# Patient Record
Sex: MALE | Race: BLACK OR AFRICAN AMERICAN | NOT HISPANIC OR LATINO | URBAN - METROPOLITAN AREA
[De-identification: names, ages, dates, MRNs, and addresses within clinical notes are randomized per-mention and may not be internally consistent; named-entity substitution may affect disease eponyms.]

---

## 2020-10-06 ENCOUNTER — INPATIENT (INPATIENT)
Facility: HOSPITAL | Age: 65
LOS: 3 days | Discharge: HOME | End: 2020-10-10
Attending: INTERNAL MEDICINE | Admitting: INTERNAL MEDICINE
Payer: COMMERCIAL

## 2020-10-06 VITALS
DIASTOLIC BLOOD PRESSURE: 75 MMHG | RESPIRATION RATE: 18 BRPM | HEART RATE: 105 BPM | SYSTOLIC BLOOD PRESSURE: 138 MMHG | TEMPERATURE: 98 F | OXYGEN SATURATION: 99 %

## 2020-10-06 DIAGNOSIS — E05.90 THYROTOXICOSIS, UNSPECIFIED WITHOUT THYROTOXIC CRISIS OR STORM: ICD-10-CM

## 2020-10-06 LAB
ALBUMIN SERPL ELPH-MCNC: 3.8 G/DL — SIGNIFICANT CHANGE UP (ref 3.3–5)
ALP SERPL-CCNC: 72 U/L — SIGNIFICANT CHANGE UP (ref 40–120)
ALT FLD-CCNC: 16 U/L — SIGNIFICANT CHANGE UP (ref 4–41)
ANION GAP SERPL CALC-SCNC: 12 MMO/L — SIGNIFICANT CHANGE UP (ref 7–14)
AST SERPL-CCNC: 13 U/L — SIGNIFICANT CHANGE UP (ref 4–40)
BASE EXCESS BLDV CALC-SCNC: 0.3 MMOL/L — SIGNIFICANT CHANGE UP
BASOPHILS # BLD AUTO: 0.05 K/UL — SIGNIFICANT CHANGE UP (ref 0–0.2)
BASOPHILS NFR BLD AUTO: 0.3 % — SIGNIFICANT CHANGE UP (ref 0–2)
BILIRUB SERPL-MCNC: 0.6 MG/DL — SIGNIFICANT CHANGE UP (ref 0.2–1.2)
BLOOD GAS VENOUS - CREATININE: 0.78 MG/DL — SIGNIFICANT CHANGE UP (ref 0.5–1.3)
BLOOD GAS VENOUS - FIO2: 21 — SIGNIFICANT CHANGE UP
BUN SERPL-MCNC: 18 MG/DL — SIGNIFICANT CHANGE UP (ref 7–23)
CALCIUM SERPL-MCNC: 9.6 MG/DL — SIGNIFICANT CHANGE UP (ref 8.4–10.5)
CHLORIDE BLDV-SCNC: 102 MMOL/L — SIGNIFICANT CHANGE UP (ref 96–108)
CHLORIDE SERPL-SCNC: 99 MMOL/L — SIGNIFICANT CHANGE UP (ref 98–107)
CO2 SERPL-SCNC: 24 MMOL/L — SIGNIFICANT CHANGE UP (ref 22–31)
CREAT SERPL-MCNC: 0.71 MG/DL — SIGNIFICANT CHANGE UP (ref 0.5–1.3)
EOSINOPHIL # BLD AUTO: 0.08 K/UL — SIGNIFICANT CHANGE UP (ref 0–0.5)
EOSINOPHIL NFR BLD AUTO: 0.5 % — SIGNIFICANT CHANGE UP (ref 0–6)
GAS PNL BLDV: 136 MMOL/L — SIGNIFICANT CHANGE UP (ref 136–146)
GLUCOSE BLDV-MCNC: 190 MG/DL — HIGH (ref 70–99)
GLUCOSE SERPL-MCNC: 200 MG/DL — HIGH (ref 70–99)
HCO3 BLDV-SCNC: 23 MMOL/L — SIGNIFICANT CHANGE UP (ref 20–27)
HCT VFR BLD CALC: 36.5 % — LOW (ref 39–50)
HCT VFR BLDV CALC: 39.3 % — SIGNIFICANT CHANGE UP (ref 39–51)
HGB BLD-MCNC: 11.8 G/DL — LOW (ref 13–17)
HGB BLDV-MCNC: 12.8 G/DL — LOW (ref 13–17)
IMM GRANULOCYTES NFR BLD AUTO: 0.4 % — SIGNIFICANT CHANGE UP (ref 0–1.5)
LACTATE BLDV-MCNC: 1.5 MMOL/L — SIGNIFICANT CHANGE UP (ref 0.5–2)
LIDOCAIN IGE QN: 39.9 U/L — SIGNIFICANT CHANGE UP (ref 7–60)
LYMPHOCYTES # BLD AUTO: 15.7 % — SIGNIFICANT CHANGE UP (ref 13–44)
LYMPHOCYTES # BLD AUTO: 2.62 K/UL — SIGNIFICANT CHANGE UP (ref 1–3.3)
MCHC RBC-ENTMCNC: 30.6 PG — SIGNIFICANT CHANGE UP (ref 27–34)
MCHC RBC-ENTMCNC: 32.3 % — SIGNIFICANT CHANGE UP (ref 32–36)
MCV RBC AUTO: 94.6 FL — SIGNIFICANT CHANGE UP (ref 80–100)
MONOCYTES # BLD AUTO: 1.37 K/UL — HIGH (ref 0–0.9)
MONOCYTES NFR BLD AUTO: 8.2 % — SIGNIFICANT CHANGE UP (ref 2–14)
NEUTROPHILS # BLD AUTO: 12.51 K/UL — HIGH (ref 1.8–7.4)
NEUTROPHILS NFR BLD AUTO: 74.9 % — SIGNIFICANT CHANGE UP (ref 43–77)
NRBC # FLD: 0 K/UL — SIGNIFICANT CHANGE UP (ref 0–0)
NT-PROBNP SERPL-SCNC: 32.32 PG/ML — SIGNIFICANT CHANGE UP
PCO2 BLDV: 45 MMHG — SIGNIFICANT CHANGE UP (ref 41–51)
PH BLDV: 7.36 PH — SIGNIFICANT CHANGE UP (ref 7.32–7.43)
PLATELET # BLD AUTO: 417 K/UL — HIGH (ref 150–400)
PMV BLD: 9.6 FL — SIGNIFICANT CHANGE UP (ref 7–13)
PO2 BLDV: < 24 MMHG — LOW (ref 35–40)
POTASSIUM BLDV-SCNC: 4.3 MMOL/L — SIGNIFICANT CHANGE UP (ref 3.4–4.5)
POTASSIUM SERPL-MCNC: 4.3 MMOL/L — SIGNIFICANT CHANGE UP (ref 3.5–5.3)
POTASSIUM SERPL-SCNC: 4.3 MMOL/L — SIGNIFICANT CHANGE UP (ref 3.5–5.3)
PROT SERPL-MCNC: 8.3 G/DL — SIGNIFICANT CHANGE UP (ref 6–8.3)
RBC # BLD: 3.86 M/UL — LOW (ref 4.2–5.8)
RBC # FLD: 11.2 % — SIGNIFICANT CHANGE UP (ref 10.3–14.5)
SAO2 % BLDV: 30.4 % — LOW (ref 60–85)
SODIUM SERPL-SCNC: 135 MMOL/L — SIGNIFICANT CHANGE UP (ref 135–145)
T3 SERPL-MCNC: 266.3 NG/DL — HIGH (ref 80–200)
T3 SERPL-MCNC: 275.3 NG/DL — HIGH (ref 80–200)
T4 FREE SERPL-MCNC: 5.73 NG/DL — HIGH (ref 0.9–1.8)
TSH SERPL-MCNC: < 0.1 UIU/ML — LOW (ref 0.27–4.2)
WBC # BLD: 16.69 K/UL — HIGH (ref 3.8–10.5)
WBC # FLD AUTO: 16.69 K/UL — HIGH (ref 3.8–10.5)

## 2020-10-06 PROCEDURE — 71260 CT THORAX DX C+: CPT | Mod: 26

## 2020-10-06 PROCEDURE — 74177 CT ABD & PELVIS W/CONTRAST: CPT | Mod: 26

## 2020-10-06 PROCEDURE — 99285 EMERGENCY DEPT VISIT HI MDM: CPT

## 2020-10-06 RX ORDER — METOPROLOL TARTRATE 50 MG
25 TABLET ORAL ONCE
Refills: 0 | Status: COMPLETED | OUTPATIENT
Start: 2020-10-06 | End: 2020-10-06

## 2020-10-06 RX ADMIN — Medication 25 MILLIGRAM(S): at 21:59

## 2020-10-06 NOTE — ED PROVIDER NOTE - CLINICAL SUMMARY MEDICAL DECISION MAKING FREE TEXT BOX
65M with unintentional weight loss, sent in by his PMD for malignancy w/u. labs, ct chest, a/p. no fevers.

## 2020-10-06 NOTE — ED PROVIDER NOTE - CONSTITUTIONAL, MLM
normal... thin, temporal wasting. Well appearing, awake, alert, oriented to person, place, time/situation and in no apparent distress.

## 2020-10-06 NOTE — ED ADULT NURSE NOTE - OBJECTIVE STATEMENT
Pt A/Ox4 states he has noticed that he lost 30 lbs in the last month, Pt states he has also had abdominal pain for the last 2-3 weeks, denies n/v/d. Pt states he was seen by his PCP and was told to come to the ED for eval, denies other sx. Pt appears well, but slightly weak, breathing even and unlabored, skin warm and dry. PIV inserted with aseptic technique 20G per RT AC, blood drawn, labeled at bedside with 2 pt identifiers and sent to lab. Pt aware of POC, NAD. Will continue to monitor. Pt pending MD whitley.

## 2020-10-06 NOTE — ED ADULT NURSE REASSESSMENT NOTE - NS ED NURSE REASSESS COMMENT FT1
Received report GISEL Theodore. Pt resting comfortably at this time, resp. even and unlabored. Denies any complaints at this time. VS as noted. NSR on the CM. NAD. Will continue to monitor.

## 2020-10-06 NOTE — ED PROVIDER NOTE - OBJECTIVE STATEMENT
65M with no PMHx sent in by his PMD for 30lb weight loss over the past 1 month. patient has no complaints. weight loss in unintentional. non-smoker. no fevers. 65M with no PMHx sent in by his PMD for 30lb weight loss over the past 1 month. patient has no complaints. weight loss in unintentional. non-smoker. no fevers. does report intermittent lower abdominal pain when he is constipated. none now.

## 2020-10-06 NOTE — CHART NOTE - NSCHARTNOTEFT_GEN_A_CORE
65M with no PMHx sent in by his PMD for malignancy work up for 30lb weight loss over the past 1 month. patient has no complaints. weight loss in unintentional. non-smoker. no fevers. does report intermittent lower abdominal pain when he is constipated. none now.  Found to have TSH <0.01. Afebrile, Tachycardic to HR  sinus rhythm. -155 systolic. AAOx3, no signs of fluid overload, abdominal exam benign. Had CT A/P with contrast for malignancy work up. Found to have 1.4 cm indeterminate left adrenal gland nodule.     Recommendations  - please obtain Free T4 and total T3  - recommend to start BB with either metoprolol or propanolol and titrate to goal normal HR (60-90) with holding parameters for HR and BP    D/W Team    Formal consult tomorrow    Shelby Mullen DO, Endocrine Fellow   Pager [phone]. from 9-5PM. After hours and on weekends please call [phone]. 65M with no PMHx sent in by his PMD for malignancy work up for 30lb weight loss over the past 1 month. patient has no complaints. weight loss in unintentional. non-smoker. no fevers. does report intermittent lower abdominal pain when he is constipated. none now.  Found to have TSH <0.01. Afebrile, Tachycardic to HR  sinus rhythm. -155 systolic. Per team, pt is AAOx3, no signs of fluid overload, abdominal exam benign. Had CT A/P with contrast for malignancy work up. Found to have 1.4 cm indeterminate left adrenal gland nodule.     Pt is not in thyroid storm  Recommendations:  - please obtain Free T4 and total T3  - recommend to start BB with either metoprolol or propanolol and titrate to goal normal HR (60-90) with holding parameters for HR and BP    D/W Team    Formal consult tomorrow    Shelby Mullen DO, Endocrine Fellow   Pager [phone]. from 9-5PM. After hours and on weekends please call [phone].

## 2020-10-06 NOTE — ED PROVIDER NOTE - PROGRESS NOTE DETAILS
Telly: TSH noted. endocrine consulted, awaiting callback. will admit to medicine on tele and start propranolol and methimazole. Bennett-Wartofsky score: 5 (HR 104bpm). Telly: results of ct a/p noted with adrenal nodule. spoke with Endocrine. recommending to wait until free t4 and t3 resulted to start propranolol and only needed if HR >100. will call back once TFTs resulted. Telly: Endocrine fellow called back- after reviewing records she is recommending to start BB, metoprolol 25mg q12 or propranolol 10-20mg to goal HR of 60-80bpm. will order one dose of Metoprolol 25mg now while in ED.

## 2020-10-07 DIAGNOSIS — R63.4 ABNORMAL WEIGHT LOSS: ICD-10-CM

## 2020-10-07 DIAGNOSIS — E11.65 TYPE 2 DIABETES MELLITUS WITH HYPERGLYCEMIA: ICD-10-CM

## 2020-10-07 DIAGNOSIS — Z29.9 ENCOUNTER FOR PROPHYLACTIC MEASURES, UNSPECIFIED: ICD-10-CM

## 2020-10-07 DIAGNOSIS — E11.9 TYPE 2 DIABETES MELLITUS WITHOUT COMPLICATIONS: ICD-10-CM

## 2020-10-07 DIAGNOSIS — I10 ESSENTIAL (PRIMARY) HYPERTENSION: ICD-10-CM

## 2020-10-07 DIAGNOSIS — R65.10 SYSTEMIC INFLAMMATORY RESPONSE SYNDROME (SIRS) OF NON-INFECTIOUS ORIGIN WITHOUT ACUTE ORGAN DYSFUNCTION: ICD-10-CM

## 2020-10-07 DIAGNOSIS — E05.90 THYROTOXICOSIS, UNSPECIFIED WITHOUT THYROTOXIC CRISIS OR STORM: ICD-10-CM

## 2020-10-07 DIAGNOSIS — E27.8 OTHER SPECIFIED DISORDERS OF ADRENAL GLAND: ICD-10-CM

## 2020-10-07 LAB
ALBUMIN SERPL ELPH-MCNC: 3.6 G/DL — SIGNIFICANT CHANGE UP (ref 3.3–5)
ALP SERPL-CCNC: 72 U/L — SIGNIFICANT CHANGE UP (ref 40–120)
ALT FLD-CCNC: 16 U/L — SIGNIFICANT CHANGE UP (ref 4–41)
ANION GAP SERPL CALC-SCNC: 14 MMO/L — SIGNIFICANT CHANGE UP (ref 7–14)
AST SERPL-CCNC: 14 U/L — SIGNIFICANT CHANGE UP (ref 4–40)
BASOPHILS # BLD AUTO: 0.03 K/UL — SIGNIFICANT CHANGE UP (ref 0–0.2)
BASOPHILS NFR BLD AUTO: 0.2 % — SIGNIFICANT CHANGE UP (ref 0–2)
BILIRUB SERPL-MCNC: 0.6 MG/DL — SIGNIFICANT CHANGE UP (ref 0.2–1.2)
BUN SERPL-MCNC: 18 MG/DL — SIGNIFICANT CHANGE UP (ref 7–23)
CALCIUM SERPL-MCNC: 10.1 MG/DL — SIGNIFICANT CHANGE UP (ref 8.4–10.5)
CHLORIDE SERPL-SCNC: 99 MMOL/L — SIGNIFICANT CHANGE UP (ref 98–107)
CO2 SERPL-SCNC: 23 MMOL/L — SIGNIFICANT CHANGE UP (ref 22–31)
CREAT SERPL-MCNC: 0.62 MG/DL — SIGNIFICANT CHANGE UP (ref 0.5–1.3)
EOSINOPHIL # BLD AUTO: 0.06 K/UL — SIGNIFICANT CHANGE UP (ref 0–0.5)
EOSINOPHIL NFR BLD AUTO: 0.5 % — SIGNIFICANT CHANGE UP (ref 0–6)
GLUCOSE BLDC GLUCOMTR-MCNC: 160 MG/DL — HIGH (ref 70–99)
GLUCOSE BLDC GLUCOMTR-MCNC: 223 MG/DL — HIGH (ref 70–99)
GLUCOSE BLDC GLUCOMTR-MCNC: 230 MG/DL — HIGH (ref 70–99)
GLUCOSE BLDC GLUCOMTR-MCNC: 263 MG/DL — HIGH (ref 70–99)
GLUCOSE SERPL-MCNC: 184 MG/DL — HIGH (ref 70–99)
HCT VFR BLD CALC: 44.6 % — SIGNIFICANT CHANGE UP (ref 39–50)
HGB BLD-MCNC: 14.5 G/DL — SIGNIFICANT CHANGE UP (ref 13–17)
IMM GRANULOCYTES NFR BLD AUTO: 0.4 % — SIGNIFICANT CHANGE UP (ref 0–1.5)
LYMPHOCYTES # BLD AUTO: 19.3 % — SIGNIFICANT CHANGE UP (ref 13–44)
LYMPHOCYTES # BLD AUTO: 2.38 K/UL — SIGNIFICANT CHANGE UP (ref 1–3.3)
MAGNESIUM SERPL-MCNC: 2.1 MG/DL — SIGNIFICANT CHANGE UP (ref 1.6–2.6)
MCHC RBC-ENTMCNC: 30.5 PG — SIGNIFICANT CHANGE UP (ref 27–34)
MCHC RBC-ENTMCNC: 32.5 % — SIGNIFICANT CHANGE UP (ref 32–36)
MCV RBC AUTO: 93.9 FL — SIGNIFICANT CHANGE UP (ref 80–100)
MONOCYTES # BLD AUTO: 0.94 K/UL — HIGH (ref 0–0.9)
MONOCYTES NFR BLD AUTO: 7.6 % — SIGNIFICANT CHANGE UP (ref 2–14)
NEUTROPHILS # BLD AUTO: 8.88 K/UL — HIGH (ref 1.8–7.4)
NEUTROPHILS NFR BLD AUTO: 72 % — SIGNIFICANT CHANGE UP (ref 43–77)
NRBC # FLD: 0 K/UL — SIGNIFICANT CHANGE UP (ref 0–0)
PHOSPHATE SERPL-MCNC: 4.9 MG/DL — HIGH (ref 2.5–4.5)
PLATELET # BLD AUTO: 332 K/UL — SIGNIFICANT CHANGE UP (ref 150–400)
PMV BLD: 9.9 FL — SIGNIFICANT CHANGE UP (ref 7–13)
POTASSIUM SERPL-MCNC: 4.8 MMOL/L — SIGNIFICANT CHANGE UP (ref 3.5–5.3)
POTASSIUM SERPL-SCNC: 4.8 MMOL/L — SIGNIFICANT CHANGE UP (ref 3.5–5.3)
PROT SERPL-MCNC: 8.3 G/DL — SIGNIFICANT CHANGE UP (ref 6–8.3)
RBC # BLD: 4.75 M/UL — SIGNIFICANT CHANGE UP (ref 4.2–5.8)
RBC # FLD: 10.9 % — SIGNIFICANT CHANGE UP (ref 10.3–14.5)
SARS-COV-2 RNA SPEC QL NAA+PROBE: SIGNIFICANT CHANGE UP
SODIUM SERPL-SCNC: 136 MMOL/L — SIGNIFICANT CHANGE UP (ref 135–145)
THYROPEROXIDASE AB SERPL-ACNC: <10 IU/ML — SIGNIFICANT CHANGE UP
WBC # BLD: 12.34 K/UL — HIGH (ref 3.8–10.5)
WBC # FLD AUTO: 12.34 K/UL — HIGH (ref 3.8–10.5)

## 2020-10-07 PROCEDURE — 99223 1ST HOSP IP/OBS HIGH 75: CPT

## 2020-10-07 PROCEDURE — 76536 US EXAM OF HEAD AND NECK: CPT | Mod: 26

## 2020-10-07 PROCEDURE — 99255 IP/OBS CONSLTJ NEW/EST HI 80: CPT

## 2020-10-07 RX ORDER — GLUCAGON INJECTION, SOLUTION 0.5 MG/.1ML
1 INJECTION, SOLUTION SUBCUTANEOUS ONCE
Refills: 0 | Status: DISCONTINUED | OUTPATIENT
Start: 2020-10-07 | End: 2020-10-10

## 2020-10-07 RX ORDER — METOPROLOL TARTRATE 50 MG
25 TABLET ORAL
Refills: 0 | Status: DISCONTINUED | OUTPATIENT
Start: 2020-10-07 | End: 2020-10-10

## 2020-10-07 RX ORDER — LOSARTAN POTASSIUM 100 MG/1
50 TABLET, FILM COATED ORAL DAILY
Refills: 0 | Status: DISCONTINUED | OUTPATIENT
Start: 2020-10-07 | End: 2020-10-10

## 2020-10-07 RX ORDER — SODIUM CHLORIDE 9 MG/ML
1000 INJECTION, SOLUTION INTRAVENOUS
Refills: 0 | Status: DISCONTINUED | OUTPATIENT
Start: 2020-10-07 | End: 2020-10-10

## 2020-10-07 RX ORDER — METOPROLOL TARTRATE 50 MG
12.5 TABLET ORAL
Refills: 0 | Status: DISCONTINUED | OUTPATIENT
Start: 2020-10-07 | End: 2020-10-07

## 2020-10-07 RX ORDER — DEXTROSE 50 % IN WATER 50 %
25 SYRINGE (ML) INTRAVENOUS ONCE
Refills: 0 | Status: DISCONTINUED | OUTPATIENT
Start: 2020-10-07 | End: 2020-10-10

## 2020-10-07 RX ORDER — DEXTROSE 50 % IN WATER 50 %
15 SYRINGE (ML) INTRAVENOUS ONCE
Refills: 0 | Status: DISCONTINUED | OUTPATIENT
Start: 2020-10-07 | End: 2020-10-10

## 2020-10-07 RX ORDER — ACETAMINOPHEN 500 MG
650 TABLET ORAL EVERY 6 HOURS
Refills: 0 | Status: DISCONTINUED | OUTPATIENT
Start: 2020-10-07 | End: 2020-10-10

## 2020-10-07 RX ORDER — LOSARTAN POTASSIUM 100 MG/1
1 TABLET, FILM COATED ORAL
Qty: 0 | Refills: 0 | DISCHARGE

## 2020-10-07 RX ORDER — DEXTROSE 50 % IN WATER 50 %
12.5 SYRINGE (ML) INTRAVENOUS ONCE
Refills: 0 | Status: DISCONTINUED | OUTPATIENT
Start: 2020-10-07 | End: 2020-10-10

## 2020-10-07 RX ORDER — INSULIN LISPRO 100/ML
VIAL (ML) SUBCUTANEOUS AT BEDTIME
Refills: 0 | Status: DISCONTINUED | OUTPATIENT
Start: 2020-10-07 | End: 2020-10-10

## 2020-10-07 RX ORDER — INSULIN LISPRO 100/ML
VIAL (ML) SUBCUTANEOUS
Refills: 0 | Status: DISCONTINUED | OUTPATIENT
Start: 2020-10-07 | End: 2020-10-10

## 2020-10-07 RX ORDER — SITAGLIPTIN AND METFORMIN HYDROCHLORIDE 500; 50 MG/1; MG/1
1 TABLET, FILM COATED ORAL
Qty: 0 | Refills: 0 | DISCHARGE

## 2020-10-07 RX ORDER — ACETAMINOPHEN 500 MG
650 TABLET ORAL ONCE
Refills: 0 | Status: COMPLETED | OUTPATIENT
Start: 2020-10-07 | End: 2020-10-07

## 2020-10-07 RX ADMIN — Medication 2: at 17:23

## 2020-10-07 RX ADMIN — Medication 650 MILLIGRAM(S): at 19:35

## 2020-10-07 RX ADMIN — LOSARTAN POTASSIUM 50 MILLIGRAM(S): 100 TABLET, FILM COATED ORAL at 13:59

## 2020-10-07 RX ADMIN — Medication 650 MILLIGRAM(S): at 18:30

## 2020-10-07 RX ADMIN — Medication 650 MILLIGRAM(S): at 00:48

## 2020-10-07 RX ADMIN — Medication 3: at 11:59

## 2020-10-07 RX ADMIN — Medication 25 MILLIGRAM(S): at 17:36

## 2020-10-07 RX ADMIN — Medication 12.5 MILLIGRAM(S): at 05:29

## 2020-10-07 NOTE — CONSULT NOTE ADULT - ASSESSMENT
65 yr old M with Hx of DM2 A1C pending, no previous history of thyroid disorder here with weight loss found to have hyperthyrodism (not in thyroid storm) after receiving IV contrast abd humberto sandhu to have indeterminate L adrenal nodule. 65 yr old M with Hx of DM2 A1C pending, no previous history of thyroid disorder here with weight loss found to have hyperthyrodism (not in thyroid storm) after receiving IV contrast abd aksi found to have indeterminate L adrenal nodule.   DD for hyperthryodism icnludes Graves DIease vs Toxic nodules, less likely thruoidsitis exacerbated by administration fo IV contrast 65 yr old M with Hx of DM2 A1C pending, no previous history of thyroid disorder here with weight loss found to have hyperthyroidism (not in thyroid storm) after receiving IV contrast and  found to have indeterminate L adrenal nodule.   DD for hyperthyroidism includes Graves DIease vs Toxic nodules (less likely thyroiditis) exacerbated by administration of IV contrast

## 2020-10-07 NOTE — ED ADULT NURSE REASSESSMENT NOTE - NS ED NURSE REASSESS COMMENT FT1
Report given to GISEL Vincent in [facility]. Pt resting comfortably, resp. even and unlabored. Denies any complaints. VS as noted. NSR on the CM. NAD. Transported to [facility].

## 2020-10-07 NOTE — H&P ADULT - PROBLEM SELECTOR PLAN 2
- Likely in setting of his hyperthyroidism  - Patient states that his PCP was worried about malignancy and had therefore sent him in for further eval, CT C/A/P here with 1.4cm adrenal nodule but likely incidentaloma, unlikely to be a cause of patient's symptoms  - Would likely need outpatient follow up for the L adrenal incidentaloma  - Of note, patient reports being out of date with his colonoscopy, was trying to get it scheduled recently - Would likely need outpatient follow up for colonoscopy once his hyperthyroidism is better controlled

## 2020-10-07 NOTE — CONSULT NOTE ADULT - PROBLEM SELECTOR RECOMMENDATION 9
-Recommend -Please check Thyroid US, TSHr Ab and TSI  -Check Thyroid US  -PLEASE AVOID FURTHER IV CONTRAST  -Recommend continuation of metoprolol 12.5 mg BI (titare to HR<90)  -Start methimazole 30mg daily (check daily LFTs and CBC to monitor for agrabnulocytosis and liver dysfunction)  -Check repeat Free T4 and Total T3 tomorrow  -Will need further evalaiton as outpt including NM scan (will need to wait 6 weeks) -Please check Thyroid US, TSHr Ab and TSI  -Check Thyroid US  -PLEASE AVOID FURTHER IV CONTRAST  -Recommend continuation of metoprolol 12.5 mg BID (titrate to HR<90)  -Start methimazole 30mg daily (check daily LFTs and CBC to monitor for agranulocytosis and liver dysfunction)  -Check repeat Free T4 and Total T3 tomorrow  -Will need further evaluation as outpt including NM scan (will need to wait 6 weeks)

## 2020-10-07 NOTE — PROGRESS NOTE ADULT - SUBJECTIVE AND OBJECTIVE BOX
CHIEF COMPLAINT:    This is a 65M with history of HTN and DM2 who presents to the hospital with complaints of ~30 pounds weight loss over the past month. Said that he has noted a significant decrease in his appetite (currently eats minimal meals twice daily) and as a result has lost ~30 pounds over the past month. Has felt weak as a result but otherwise denied any other significant complaints. Said that people have told him that his arms seem to shake when he holds things but he has not noted any significant tremors or other thyroid complaints. Was following up with his PCP for his weight loss where he was noted to be very weak and was then sent to the hospital for further eval. Of note, patient had COVID-19 in March but states that he has fully recovered from that, denied any fevers/chills, URI complaints, or GI complaints. Does note occasional diaphoresis but said that these episodes seem to occur only when he takes APA or NSAIDS. Denies any dysphagia/odynophagia. Denies any depression/anxiety.     On arrival to the ED, his vitals were T 97.5, P 105, /75, RR 18, O2 sat 99% RA. His lab work showed leukocytosis (without neutrophilia) with mild anemia, suppressed TSH and elevated total T3 and free T4. He had a CT C/A/P with IV contrast that showed a 1.4cm L adrenal nodule but otherwise was negative for any other acute findings. ED spoke with endocrine overnight who recommended placing patient on metoprolol and he was given metoprolol 25mg PO x1. He was then admitted to medicine on telemetry.   SUBJECTIVE:     REVIEW OF SYSTEMS:  Review of Systems: REVIEW OF SYSTEMS:    CONSTITUTIONAL: No fevers or chills; +generalized weakness, +~30 lbs weight loss (unintentional)  EYES: No visual changes or eye discharge  ENT: No rhinorrhea or sore throat  NECK: No pain or stiffness  RESPIRATORY: No cough, wheezing, hemoptysis; No shortness of breath  CARDIOVASCULAR: No chest pain or palpitations; No lower extremity edema  GASTROINTESTINAL: No abdominal or epigastric pain. No nausea, vomiting, or hematemesis; No diarrhea or constipation. No melena or hematochezia.  BACK: No back pain, no flank pain  GENITOURINARY: No dysuria, frequency or hematuria  NEUROLOGICAL: No numbness or weakness  SKIN: No itching, burning, rashes, or lesions       Vital Signs Last 24 Hrs  T(C): 36.6 (07 Oct 2020 09:01), Max: 36.9 (06 Oct 2020 20:53)  T(F): 97.9 (07 Oct 2020 09:01), Max: 98.5 (06 Oct 2020 20:53)  HR: 87 (07 Oct 2020 09:01) (80 - 105)  BP: 143/76 (07 Oct 2020 09:01) (123/80 - 155/82)  BP(mean): 105 (06 Oct 2020 20:53) (105 - 105)  RR: 18 (07 Oct 2020 09:01) (18 - 18)  SpO2: 100% (07 Oct 2020 09:01) (99% - 100%)    I&O's Summary      CAPILLARY BLOOD GLUCOSE      POCT Blood Glucose.: 263 mg/dL (07 Oct 2020 11:42)  POCT Blood Glucose.: 160 mg/dL (07 Oct 2020 08:54)      PHYSICAL EXAM:  GENERAL: No acute distress, mildly cachectic  ENT: EOMI, PERRL, conjunctiva and sclera clear, Neck supple, No JVD, moist mucosa  CHEST/LUNG: Clear to auscultation bilaterally; No wheeze, equal breath sounds bilaterally   BACK: No spinal tenderness, no CVA tenderness  HEART: Regular rate and rhythm; No murmurs, rubs, or gallops  ABDOMEN: Soft, Nontender, Nondistended; Bowel sounds present  EXTREMITIES:  No clubbing, cyanosis, or edema  PSYCH: Nl behavior, nl affect  NEUROLOGY: AAOx3, non-focal, cranial nerves intact  SKIN: Normal color, No rashes or lesions       MEDICATIONS:  MEDICATIONS  (STANDING):  dextrose 5%. 1000 milliLiter(s) (50 mL/Hr) IV Continuous <Continuous>  dextrose 50% Injectable 12.5 Gram(s) IV Push once  dextrose 50% Injectable 25 Gram(s) IV Push once  dextrose 50% Injectable 25 Gram(s) IV Push once  insulin lispro (HumaLOG) corrective regimen sliding scale   SubCutaneous three times a day before meals  insulin lispro (HumaLOG) corrective regimen sliding scale   SubCutaneous at bedtime  losartan 50 milliGRAM(s) Oral daily  metoprolol tartrate 12.5 milliGRAM(s) Oral two times a day      LABS: All Labs Reviewed:                        14.5   12.34 )-----------( 332      ( 07 Oct 2020 04:44 )             44.6     10-07    136  |  99  |  18  ----------------------------<  184<H>  4.8   |  23  |  0.62    Ca    10.1      07 Oct 2020 04:44  Phos  4.9     10-07  Mg     2.1     10-07    TPro  8.3  /  Alb  3.6  /  TBili  0.6  /  DBili  x   /  AST  14  /  ALT  16  /  AlkPhos  72  10-07               11.8   16.69 )-----------( 417      ( 06 Oct 2020 18:25 )             36.5   Complete Blood Count + Automated Diff (10.06.20 @ 18:25)   Auto Neutrophil #: 12.51 K/uL   Auto Neutrophil %: 74.9 %     10-06    135  |  99  |  18  ----------------------------<  200<H>  4.3   |  24  |  0.71    Ca    9.6      06 Oct 2020 18:25    TPro  8.3  /  Alb  3.8  /  TBili  0.6  /  DBili  x   /  AST  13  /  ALT  16  /  AlkPhos  72  10-06    LIVER FUNCTIONS - ( 06 Oct 2020 18:25 )  Alb: 3.8 g/dL / Pro: 8.3 g/dL / ALK PHOS: 72 u/L / ALT: 16 u/L / AST: 13 u/L / GGT: x           Thyroid Stimulating Hormone, Serum (10.06.20 @ 18:25)   Thyroid Stimulating Hormone, Serum: < 0.10 uIU/mL   Free Thyroxine, Serum in AM (10.06.20 @ 21:15)   Free Thyroxine, Serum: 5.73 ng/dL   Triiodothyronine, Total (T3 Total) (10.06.20 @ 21:15)   Triiodothyronine, Total (T3 Total): 266.3 ng/dL     Lactate - 1.5        < end of copied text >        Blood Culture:   Urine Culture      RADIOLOGY/EKG:  < from: CT Abdomen and Pelvis w/ IV Cont (10.06.20 @ 20:29) >    FINDINGS:  CHEST:  LUNGS AND LARGE AIRWAYS: Patent central airways. No pulmonary nodules.  PLEURA: No pleural effusion.  VESSELS: Coronary atherosclerotic calcifications.  HEART: Heart size is normal. No pericardial effusion.  MEDIASTINUM AND NINOSKA: No lymphadenopathy.  CHEST WALL AND LOWERNECK: Unremarkable.    ABDOMEN AND PELVIS:  LIVER: Within normal limits.  BILE DUCTS: Normal caliber.  GALLBLADDER: Within normal limits.  SPLEEN: Within normal limits.  PANCREAS: Within normal limits.  ADRENALS: 1.4 cm indeterminant left adrenal gland nodule.  KIDNEYS/URETERS: Within normal limits.    BLADDER: Within normal limits.  REPRODUCTIVE ORGANS: Prostate is enlarged.    BOWEL: No bowel obstruction. Appendix is normal.  PERITONEUM: No ascites.  VESSELS: Within normal limits.  RETROPERITONEUM/LYMPH NODES: No lymphadenopathy.  ABDOMINAL WALL: Within normal limits.  BONES: Degenerative changes.    IMPRESSION:  1.4 cm indeterminate left adrenal gland nodule. Follow-up nonemergent adrenal protocol CT or MRI can be performed for further evaluation.  ASSESSMENT AND PLAN:  This is a 65M with history of HTN and DM2 who presents to the hospital with an unintentional weight loss found to have hyperthyroidism.       Problem/Plan - 1:  ·  Problem: Hyperthyroidism.  Plan: - Patient with suppressed TSH with elevated free T4 and total T3, concern for autoimmune thyroiditis but thyroid gland not significant enlarged and no TTP on palpation of the thyroid gland, no nodules felt  - Would check TPO antibodies  - Will place on metoprolol 12.5mg PO BID  - f/u endocrine in AM  - Currently his Bennett-Wartofsky score is 5 (for HR ), low suspicion of thyroid storm currently.     Problem/Plan - 2:  ·  Problem: Weight loss, unintentional.  Plan: - Likely in setting of his hyperthyroidism  - Patient states that his PCP was worried about malignancy and had therefore sent him in for further eval, CT C/A/P here with 1.4cm adrenal nodule but likely incidentaloma, unlikely to be a cause of patient's symptoms  - Would likely need outpatient follow up for the L adrenal incidentaloma  - Of note, patient reports being out of date with his colonoscopy, was trying to get it scheduled recently - Would likely need outpatient follow up for colonoscopy once his hyperthyroidism is better controlled.     Problem/Plan - 3:  ·  Problem: SIRS (systemic inflammatory response syndrome).  Plan: - Unclear cause of leukocytosis/tachycardia, possibly related to hyperthyroidism   - Patient denies any infectious complaints, CT C/A/P without infectious findings  - Will monitor off abx for now  - no fevers currently, if spikes then would consider BCx/UCx.     Problem/Plan - 4:  ·  Problem: Type 2 diabetes mellitus without complication, without long-term current use of insulin.  Plan: - On janumet, will hold for now  - HISS, FS qAC, diabetic diet  - c/w atorvastatin.     Problem/Plan - 5:  ·  Problem: Essential hypertension.  Plan: - Patient on BP medication but cannot remember name, med hx pharmacist emailed to help reconcile patient's medications.     Problem/Plan - 6:  Problem: Need for prophylactic measure. Plan: DVT ppx - SCDs  Activity - Ambulate as tolerated  Diet - DASH/CC diet.        DVT PPX:    ADVANCED DIRECTIVE:    DISPOSITION: CHIEF COMPLAINT: patient condition improving since yesterday seems to be more stronger and appetite improved    This is a 65M with history of HTN and DM2 who presents to the hospital with complaints of ~30 pounds weight loss over the past month. Said that he has noted a significant decrease in his appetite (currently eats minimal meals twice daily) and as a result has lost ~30 pounds over the past month. Has felt weak as a result but otherwise denied any other significant complaints. Said that people have told him that his arms seem to shake when he holds things but he has not noted any significant tremors or other thyroid complaints. Was following up with his PCP for his weight loss where he was noted to be very weak and was then sent to the hospital for further eval. Of note, patient had COVID-19 in March but states that he has fully recovered from that, denied any fevers/chills, URI complaints, or GI complaints. Does note occasional diaphoresis but said that these episodes seem to occur only when he takes APA or NSAIDS. Denies any dysphagia/odynophagia. Denies any depression/anxiety.     On arrival to the ED, his vitals were T 97.5, P 105, /75, RR 18, O2 sat 99% RA. His lab work showed leukocytosis (without neutrophilia) with mild anemia, suppressed TSH and elevated total T3 and free T4. He had a CT C/A/P with IV contrast that showed a 1.4cm L adrenal nodule but otherwise was negative for any other acute findings. ED spoke with endocrine overnight who recommended placing patient on metoprolol and he was given metoprolol 25mg PO x1. He was then admitted to medicine on telemetry.   SUBJECTIVE:     REVIEW OF SYSTEMS:  Review of Systems: REVIEW OF SYSTEMS:    CONSTITUTIONAL: No fevers or chills; +generalized weakness, +~30 lbs weight loss (unintentional)  EYES: No visual changes or eye discharge  ENT: No rhinorrhea or sore throat  NECK: No pain or stiffness  RESPIRATORY: No cough, wheezing, hemoptysis; No shortness of breath  CARDIOVASCULAR: No chest pain or palpitations; No lower extremity edema  GASTROINTESTINAL: No abdominal or epigastric pain. No nausea, vomiting, or hematemesis; No diarrhea or constipation. No melena or hematochezia.  BACK: No back pain, no flank pain  GENITOURINARY: No dysuria, frequency or hematuria  NEUROLOGICAL: No numbness or weakness  SKIN: No itching, burning, rashes, or lesions       Vital Signs Last 24 Hrs  T(C): 36.6 (07 Oct 2020 09:01), Max: 36.9 (06 Oct 2020 20:53)  T(F): 97.9 (07 Oct 2020 09:01), Max: 98.5 (06 Oct 2020 20:53)  HR: 87 (07 Oct 2020 09:01) (80 - 105)  BP: 143/76 (07 Oct 2020 09:01) (123/80 - 155/82)  BP(mean): 105 (06 Oct 2020 20:53) (105 - 105)  RR: 18 (07 Oct 2020 09:01) (18 - 18)  SpO2: 100% (07 Oct 2020 09:01) (99% - 100%)    I&O's Summary      CAPILLARY BLOOD GLUCOSE      POCT Blood Glucose.: 263 mg/dL (07 Oct 2020 11:42)  POCT Blood Glucose.: 160 mg/dL (07 Oct 2020 08:54)      PHYSICAL EXAM:  GENERAL: No acute distress, mildly cachectic  ENT: EOMI, PERRL, conjunctiva and sclera clear, Neck supple, No JVD, moist mucosa  CHEST/LUNG: Clear to auscultation bilaterally; No wheeze, equal breath sounds bilaterally   BACK: No spinal tenderness, no CVA tenderness  HEART: Regular rate and rhythm; No murmurs, rubs, or gallops  ABDOMEN: Soft, Nontender, Nondistended; Bowel sounds present  EXTREMITIES:  No clubbing, cyanosis, or edema  PSYCH: Nl behavior, nl affect  NEUROLOGY: AAOx3, non-focal, cranial nerves intact  SKIN: Normal color, No rashes or lesions       MEDICATIONS:  MEDICATIONS  (STANDING):  dextrose 5%. 1000 milliLiter(s) (50 mL/Hr) IV Continuous <Continuous>  dextrose 50% Injectable 12.5 Gram(s) IV Push once  dextrose 50% Injectable 25 Gram(s) IV Push once  dextrose 50% Injectable 25 Gram(s) IV Push once  insulin lispro (HumaLOG) corrective regimen sliding scale   SubCutaneous three times a day before meals  insulin lispro (HumaLOG) corrective regimen sliding scale   SubCutaneous at bedtime  losartan 50 milliGRAM(s) Oral daily  metoprolol tartrate 12.5 milliGRAM(s) Oral two times a day      LABS: All Labs Reviewed:                        14.5   12.34 )-----------( 332      ( 07 Oct 2020 04:44 )             44.6     10-07    136  |  99  |  18  ----------------------------<  184<H>  4.8   |  23  |  0.62    Ca    10.1      07 Oct 2020 04:44  Phos  4.9     10-07  Mg     2.1     10-07    TPro  8.3  /  Alb  3.6  /  TBili  0.6  /  DBili  x   /  AST  14  /  ALT  16  /  AlkPhos  72  10-07               11.8   16.69 )-----------( 417      ( 06 Oct 2020 18:25 )             36.5   Complete Blood Count + Automated Diff (10.06.20 @ 18:25)   Auto Neutrophil #: 12.51 K/uL   Auto Neutrophil %: 74.9 %     10-06    135  |  99  |  18  ----------------------------<  200<H>  4.3   |  24  |  0.71    Ca    9.6      06 Oct 2020 18:25    TPro  8.3  /  Alb  3.8  /  TBili  0.6  /  DBili  x   /  AST  13  /  ALT  16  /  AlkPhos  72  10-06    LIVER FUNCTIONS - ( 06 Oct 2020 18:25 )  Alb: 3.8 g/dL / Pro: 8.3 g/dL / ALK PHOS: 72 u/L / ALT: 16 u/L / AST: 13 u/L / GGT: x           Thyroid Stimulating Hormone, Serum (10.06.20 @ 18:25)   Thyroid Stimulating Hormone, Serum: < 0.10 uIU/mL   Free Thyroxine, Serum in AM (10.06.20 @ 21:15)   Free Thyroxine, Serum: 5.73 ng/dL   Triiodothyronine, Total (T3 Total) (10.06.20 @ 21:15)   Triiodothyronine, Total (T3 Total): 266.3 ng/dL     Lactate - 1.5        < end of copied text >        Blood Culture:   Urine Culture      RADIOLOGY/EKG:  < from: CT Abdomen and Pelvis w/ IV Cont (10.06.20 @ 20:29) >    FINDINGS:  CHEST:  LUNGS AND LARGE AIRWAYS: Patent central airways. No pulmonary nodules.  PLEURA: No pleural effusion.  VESSELS: Coronary atherosclerotic calcifications.  HEART: Heart size is normal. No pericardial effusion.  MEDIASTINUM AND NINOSKA: No lymphadenopathy.  CHEST WALL AND LOWERNECK: Unremarkable.    ABDOMEN AND PELVIS:  LIVER: Within normal limits.  BILE DUCTS: Normal caliber.  GALLBLADDER: Within normal limits.  SPLEEN: Within normal limits.  PANCREAS: Within normal limits.  ADRENALS: 1.4 cm indeterminant left adrenal gland nodule.  KIDNEYS/URETERS: Within normal limits.    BLADDER: Within normal limits.  REPRODUCTIVE ORGANS: Prostate is enlarged.    BOWEL: No bowel obstruction. Appendix is normal.  PERITONEUM: No ascites.  VESSELS: Within normal limits.  RETROPERITONEUM/LYMPH NODES: No lymphadenopathy.  ABDOMINAL WALL: Within normal limits.  BONES: Degenerative changes.    IMPRESSION:  1.4 cm indeterminate left adrenal gland nodule. Follow-up nonemergent adrenal protocol CT or MRI can be performed for further evaluation.  ASSESSMENT AND PLAN:  This is a 65M with history of HTN and DM2 who presents to the hospital with an unintentional weight loss found to have hyperthyroidism.       Problem/Plan - 1:  ·  Problem: Hyperthyroidism.  Plan: - Patient with suppressed TSH with elevated free T4 and total T3, concern for autoimmune thyroiditis but thyroid gland not significant enlarged and no TTP on palpation of the thyroid gland, no nodules felt  - Would check TPO antibodies  - Will place on metoprolol 12.5mg PO BID  -   endocrine consult appreciated        Problem/Plan - 2:  ·  Problem: Weight loss, unintentional.  Plan: - Likely in setting of his hyperthyroidism  - Patient states that his PCP was worried about malignancy and had therefore sent him in for further eval, CT C/A/P here with 1.4cm adrenal nodule but likely incidentaloma, unlikely to be a cause of patient's symptoms  - Would likely need outpatient follow up for the L adrenal incidentaloma   .     Problem/Plan - 3:  ·  Problem: SIRS (systemic inflammatory response syndrome).  Plan: - Unclear cause of leukocytosis/tachycardia, possibly related to hyperthyroidism   - Patient denies any infectious complaints, CT C/A/P without infectious findings  - Will monitor off abx for now  - no fevers currently, if spikes then would consider BCx/UCx.   -10/7/2020 improving his leukocytosis    Problem/Plan - 4:  ·  Problem: Type 2 diabetes mellitus without complication, without long-term current use of insulin.  Plan: - On janumet, will hold for now  - HISS, FS qAC, diabetic diet  - c/w atorvastatin.     Problem/Plan - 5:  ·  Problem: Essential hypertension.  Plan: - Patient on BP medication but cannot remember name, med hx pharmacist emailed to help reconcile patient's medications.     Problem/Plan - 6:  Problem: Need for prophylactic measure. Plan: DVT ppx - SCDs  Activity - Ambulate as tolerated  Diet - DASH/CC diet.        DVT PPX:    ADVANCED DIRECTIVE:    DISPOSITION: discharge home after the MRI and endocrinology follow-up

## 2020-10-07 NOTE — CONSULT NOTE ADULT - PROBLEM SELECTOR RECOMMENDATION 2
Check Plasma Renin/Aldosterone as well as plasma metanephrines  Dex suppression test as outpt  Would repeat MRI (adrenal protocol) WITHOUT CONTRAST for further evaluaiton of adrenal nodules (with chemical shift imaging to asssess for maligancy)-can be doen inpt vs outpt Check Plasma Renin/Aldosterone as well as plasma metanephrine  Dex suppression test as outpt  Would repeat MRI (adrenal protocol) WITHOUT CONTRAST for further evaluation of adrenal nodules (with chemical shift imaging to assess for malignancy)-can be done inpt vs outpt

## 2020-10-07 NOTE — H&P ADULT - ASSESSMENT
This is a 65M with history of HTN and DM2 who presents to the hospital with an unintentional weight loss found to have hyperthyroidism.

## 2020-10-07 NOTE — CONSULT NOTE ADULT - PROBLEM SELECTOR RECOMMENDATION 3
CHO diet and FS AC and HS  Check A1C  Can keep on low scale befroe meals and bedtime for now  If FS persistanly >180 can start low weight based basal bolus regimen  Endocrishanique will follow  Patient will need follow up appt at [facility] [address] [facility] [phone] CHO diet and FS AC and HS  Check A1C  Can keep on low scale before meals and bedtime for now  If FS persistently >180 can start low weight based basal bolus regimen  Endocrine will follow  Patient will need follow up appt at Endocrine Office [address] [place] [address] [phone]

## 2020-10-07 NOTE — CONSULT NOTE ADULT - ATTENDING COMMENTS
Kelly Webb (pager [phone])  On evenings and weekends, please call [phone] or page endocrine fellow on call.   Please note that this patient may be followed by different provider tomorrow. If no answer, contact endocrine fellow on call.

## 2020-10-07 NOTE — H&P ADULT - PROBLEM SELECTOR PLAN 3
- Unclear cause of leukocytosis/tachycardia, possibly related to hyperthyroidism   - Patient denies any infectious complaints, CT C/A/P without infectious findings  - Will monitor off abx for now  - no fevers currently, if spikes then would consider BCx/UCx

## 2020-10-07 NOTE — H&P ADULT - NSHPPHYSICALEXAM_GEN_ALL_CORE
Vital Signs Last 24 Hrs  T(C): 36.8 (07 Oct 2020 00:33), Max: 36.9 (06 Oct 2020 20:53)  T(F): 98.3 (07 Oct 2020 00:33), Max: 98.5 (06 Oct 2020 20:53)  HR: 100 (07 Oct 2020 00:33) (95 - 105)  BP: 133/56 (07 Oct 2020 00:33) (133/56 - 155/82)  BP(mean): 105 (06 Oct 2020 20:53) (105 - 105)  RR: 18 (07 Oct 2020 00:33) (18 - 18)  SpO2: 100% (07 Oct 2020 00:33) (99% - 100%)    GENERAL: No acute distress, mildly cachectic  ENT: EOMI, PERRL, conjunctiva and sclera clear, Neck supple, No JVD, moist mucosa  CHEST/LUNG: Clear to auscultation bilaterally; No wheeze, equal breath sounds bilaterally   BACK: No spinal tenderness, no CVA tenderness  HEART: Regular rate and rhythm; No murmurs, rubs, or gallops  ABDOMEN: Soft, Nontender, Nondistended; Bowel sounds present  EXTREMITIES:  No clubbing, cyanosis, or edema  PSYCH: Nl behavior, nl affect  NEUROLOGY: AAOx3, non-focal, cranial nerves intact  SKIN: Normal color, No rashes or lesions

## 2020-10-07 NOTE — H&P ADULT - HISTORY OF PRESENT ILLNESS
This is a 65M with history of HTN and DM2 who presents to the hospital with complaints of ~30 pounds weight loss over the past month. Said that he has noted a significant decrease in his appetite (currently eats minimal meals twice daily) and as a result has lost ~30 pounds over the past month. Has felt weak as a result but otherwise denied any other significant complaints. Said that people have told him that his arms seem to shake when he holds things but he has not noted any significant tremors or other thyroid complaints. Was following up with his PCP for his weight loss where he was noted to be very weak and was then sent to the hospital for further eval. Of note, patient had COVID-19 in March but states that he has fully recovered from that, denied any fevers/chills, URI complaints, or GI complaints. Does note occasional diaphoresis but said that these episodes seem to occur only when he takes APA or NSAIDS.     On arrival to the ED, his vitals were T 97.5, P 105, /75, RR 18, O2 sat 99% RA. His lab work showed leukocytosis (without neutrophilia) with mild anemia, suppressed TSH and elevated total T3 and free T4. He had a CT C/A/P with IV contrast that showed a 1.4cm L adrenal nodule but otherwise was negative for any other acute findings. ED spoke with endocrine overnight who recommended placing patient on metoprolol and he was given metoprolol 25mg PO x1. He was then admitted to medicine on telemetry. This is a 65M with history of HTN and DM2 who presents to the hospital with complaints of ~30 pounds weight loss over the past month. Said that he has noted a significant decrease in his appetite (currently eats minimal meals twice daily) and as a result has lost ~30 pounds over the past month. Has felt weak as a result but otherwise denied any other significant complaints. Said that people have told him that his arms seem to shake when he holds things but he has not noted any significant tremors or other thyroid complaints. Was following up with his PCP for his weight loss where he was noted to be very weak and was then sent to the hospital for further eval. Of note, patient had COVID-19 in March but states that he has fully recovered from that, denied any fevers/chills, URI complaints, or GI complaints. Does note occasional diaphoresis but said that these episodes seem to occur only when he takes APA or NSAIDS. Denies any dysphagia/odynophagia. Denies any depression/anxiety.     On arrival to the ED, his vitals were T 97.5, P 105, /75, RR 18, O2 sat 99% RA. His lab work showed leukocytosis (without neutrophilia) with mild anemia, suppressed TSH and elevated total T3 and free T4. He had a CT C/A/P with IV contrast that showed a 1.4cm L adrenal nodule but otherwise was negative for any other acute findings. ED spoke with endocrine overnight who recommended placing patient on metoprolol and he was given metoprolol 25mg PO x1. He was then admitted to medicine on telemetry.

## 2020-10-07 NOTE — H&P ADULT - PROBLEM SELECTOR PLAN 5
- Patient on BP medication but cannot remember name, med hx pharmacist emailed to help reconcile patient's medications

## 2020-10-07 NOTE — H&P ADULT - PROBLEM SELECTOR PLAN 1
- Patient with suppressed TSH with elevated free T4 and total T3, concern for autoimmune thyroiditis but thyroid gland not significant enlarged and no TTP on palpation of the thyroid gland, no nodules felt  - Would check TPO antibodies  - Will place on metoprolol 12.5mg PO BID  - f/u endocrine in AM  - Currently his Bennett-Wartofsky score is 5 (for HR ), low suspicion of thyroid storm currently

## 2020-10-07 NOTE — H&P ADULT - NSHPREVIEWOFSYSTEMS_GEN_ALL_CORE
REVIEW OF SYSTEMS:    CONSTITUTIONAL: No fevers or chills; +generalized weakness, +~30 lbs weight loss (unintentional)  EYES: No visual changes or eye discharge  ENT: No rhinorrhea or sore throat  NECK: No pain or stiffness  RESPIRATORY: No cough, wheezing, hemoptysis; No shortness of breath  CARDIOVASCULAR: No chest pain or palpitations; No lower extremity edema  GASTROINTESTINAL: No abdominal or epigastric pain. No nausea, vomiting, or hematemesis; No diarrhea or constipation. No melena or hematochezia.  BACK: No back pain, no flank pain  GENITOURINARY: No dysuria, frequency or hematuria  NEUROLOGICAL: No numbness or weakness  SKIN: No itching, burning, rashes, or lesions

## 2020-10-07 NOTE — CONSULT NOTE ADULT - SUBJECTIVE AND OBJECTIVE BOX
HPI:  This is a 65M with history of HTN and DM2 who presents to the hospital with complaints of ~30 pounds weight loss over the past month. Said that he has noted a significant decrease in his appetite (currently eats minimal meals twice daily) and as a result has lost ~30 pounds over the past month. Has felt weak as a result but otherwise denied any other significant complaints. Said that people have told him that his arms seem to shake when he holds things but he has not noted any significant tremors or other thyroid complaints. Was following up with his PCP for his weight loss where he was noted to be very weak and was then sent to the hospital for further eval. Of note, patient had COVID-19 in March but states that he has fully recovered from that, denied any fevers/chills, URI complaints, or GI complaints. Does note occasional diaphoresis but said that these episodes seem to occur only when he takes APA or NSAIDS. Denies any dysphagia/odynophagia. Denies any depression/anxiety.     On arrival to the ED, his vitals were T 97.5, P 105, /75, RR 18, O2 sat 99% RA. His lab work showed leukocytosis (without neutrophilia) with mild anemia, suppressed TSH and elevated total T3 and free T4. He had a CT C/A/P with IV contrast that showed a 1.4cm L adrenal nodule but otherwise was negative for any other acute findings. ED spoke with endocrine overnight who recommended placing patient on metoprolol and he was given metoprolol 25mg PO x1. He was then admitted to medicine on telemetry.  (07 Oct 2020 04:40)      PAST MEDICAL & SURGICAL HISTORY:  HTN (hypertension)    Diabetes mellitus    No significant past surgical history        FAMILY HISTORY:  FH: hypothyroidism        Social History:    Outpatient Medications:    MEDICATIONS  (STANDING):  dextrose 5%. 1000 milliLiter(s) (50 mL/Hr) IV Continuous <Continuous>  dextrose 50% Injectable 12.5 Gram(s) IV Push once  dextrose 50% Injectable 25 Gram(s) IV Push once  dextrose 50% Injectable 25 Gram(s) IV Push once  insulin lispro (HumaLOG) corrective regimen sliding scale   SubCutaneous three times a day before meals  insulin lispro (HumaLOG) corrective regimen sliding scale   SubCutaneous at bedtime  metoprolol tartrate 12.5 milliGRAM(s) Oral two times a day    MEDICATIONS  (PRN):  dextrose 40% Gel 15 Gram(s) Oral once PRN Blood Glucose LESS THAN 70 milliGRAM(s)/deciliter  glucagon  Injectable 1 milliGRAM(s) IntraMuscular once PRN Glucose LESS THAN 70 milligrams/deciliter      Allergies    No Known Allergies    Intolerances      Review of Systems:  Constitutional: No fever  Eyes: No blurry vision  Neuro: No tremors  HEENT: No pain  Cardiovascular: No chest pain, palpitations  Respiratory: No SOB, no cough  GI: No nausea, vomiting, abdominal pain  : No dysuria  Skin: no rash  Psych: no depression  Endocrine: no polyuria, polydipsia  Hem/lymph: no swelling  Osteoporosis: no fractures    ALL OTHER SYSTEMS REVIEWED AND NEGATIVE    UNABLE TO OBTAIN    PHYSICAL EXAM:  VITALS: T(C): 36.6 (10-07-20 @ 09:01)  T(F): 97.9 (10-07-20 @ 09:01), Max: 98.5 (10-06-20 @ 20:53)  HR: 87 (10-07-20 @ 09:01) (80 - 105)  BP: 143/76 (10-07-20 @ 09:01) (123/80 - 155/82)  RR:  (18 - 18)  SpO2:  (99% - 100%)  Wt(kg): --  GENERAL: NAD, well-groomed, well-developed  EYES: No proptosis, no lid lag, anicteric  HEENT:  Atraumatic, Normocephalic, moist mucous membranes  THYROID: Normal size, no palpable nodules  RESPIRATORY: Clear to auscultation bilaterally; No rales, rhonchi, wheezing, or rubs  CARDIOVASCULAR: Regular rate and rhythm; No murmurs; no peripheral edema  GI: Soft, nontender, non distended, normal bowel sounds  SKIN: Dry, intact, No rashes or lesions  MUSCULOSKELETAL: Full range of motion, normal strength  NEURO: sensation intact, extraocular movements intact, no tremor, normal reflexes  PSYCH: Alert and oriented x 3, normal affect, normal mood  CUSHING'S SIGNS: no striae    POCT Blood Glucose.: 160 mg/dL (10-07-20 @ 08:54)                            14.5   12.34 )-----------( 332      ( 07 Oct 2020 04:44 )             44.6       10-07    136  |  99  |  18  ----------------------------<  184<H>  4.8   |  23  |  0.62    EGFR if : 121  EGFR if non : 104    Ca    10.1      10-07  Mg     2.1     10-07  Phos  4.9     10-07    TPro  8.3  /  Alb  3.6  /  TBili  0.6  /  DBili  x   /  AST  14  /  ALT  16  /  AlkPhos  72  10-07      Thyroid Function Tests:  10-06 @ 21:15 TSH -- FreeT4 5.73 T3 266.3 Anti TPO -- Anti Thyroglobulin Ab -- TSI --  10-06 @ 18:25 TSH < 0.10 FreeT4 -- T3 275.3 Anti TPO -- Anti Thyroglobulin Ab -- TSI --              Radiology:                  HPI:  This is a 65M with history of HTN and DM2 who presents to the hospital with complaints of ~30 pounds weight loss over the past month. Said that he has noted a significant decrease in his appetite (currently eats minimal meals twice daily) and as a result has lost ~30 pounds over the past month. Has felt weak as a result but otherwise denied any other significant complaints. Said that people have told him that his arms seem to shake when he holds things but he has not noted any significant tremors or other thyroid complaints. Was following up with his PCP for his weight loss where he was noted to be very weak and was then sent to the hospital for further eval. Of note, patient had COVID-19 in March but states that he has fully recovered from that, denied any fevers/chills, URI complaints, or GI complaints. Does note occasional diaphoresis but said that these episodes seem to occur only when he takes APA or NSAIDS. Denies any dysphagia/odynophagia. Denies any depression/anxiety.     On arrival to the ED, his vitals were T 97.5, P 105, /75, RR 18, O2 sat 99% RA. His lab work showed leukocytosis (without neutrophilia) with mild anemia, suppressed TSH and elevated total T3 and free T4. He had a CT C/A/P with IV contrast that showed a 1.4cm L adrenal nodule but otherwise was negative for any other acute findings. ED spoke with endocrine overnight who recommended placing patient on metoprolol and he was given metoprolol 25mg PO x1. He was then admitted to medicine on telemetry.  (07 Oct 2020 04:40)    Endocrine History: 65 yr old M with PMH of DM2, no repvious thyroid history here with 30 pound weight loss. Endocrine consulted for hyperthyrodism with TSH <0.1 TT3 266/275 FT4 5.73. Patietn deneis palaitoins, diarrhea or heat intoleracne but has been experiecnign tremors. No FH of thyrodi disease. No Hx of thyroid nodules. No nekc pain or flu like symptoms. No cardiac history. Has never been on lihtium or amiodareon. Receuived IV contrast for CT scan doen for maligancy workup and was foudn tohev a 1.4 cm indeterminate L adrenal nodule. No diahoresis, headaches or flsuhing.No proxmial muscle weakness or easy brusing or bleeding. No uncnotrlled hypertension, hyponatrermai or hyperkalemia.   For DM2, no known complications. Takes Janumet 50-1000mg BID. Does not check FS at home. Follows a dietwith high intake of carsb.         PAST MEDICAL & SURGICAL HISTORY:  HTN (hypertension)    Diabetes mellitus    No significant past surgical history        FAMILY HISTORY:  FH: hypothyroidism        Social History: nonsmoker, nondrinker    Outpatient Medications: Losartan, Janumet, Atorvastatin    MEDICATIONS  (STANDING):  dextrose 5%. 1000 milliLiter(s) (50 mL/Hr) IV Continuous <Continuous>  dextrose 50% Injectable 12.5 Gram(s) IV Push once  dextrose 50% Injectable 25 Gram(s) IV Push once  dextrose 50% Injectable 25 Gram(s) IV Push once  insulin lispro (HumaLOG) corrective regimen sliding scale   SubCutaneous three times a day before meals  insulin lispro (HumaLOG) corrective regimen sliding scale   SubCutaneous at bedtime  metoprolol tartrate 12.5 milliGRAM(s) Oral two times a day    MEDICATIONS  (PRN):  dextrose 40% Gel 15 Gram(s) Oral once PRN Blood Glucose LESS THAN 70 milliGRAM(s)/deciliter  glucagon  Injectable 1 milliGRAM(s) IntraMuscular once PRN Glucose LESS THAN 70 milligrams/deciliter      Allergies    No Known Allergies    Intolerances      Review of Systems:  Constitutional: No fever, +weight loss  Eyes: No blurry vision  Neuro: + tremors  HEENT: No pain  Cardiovascular: No chest pain, palpitations  Respiratory: No SOB, no cough  GI: No nausea, vomiting, abdominal pain  : No dysuria  Skin: no rash  Psych: no depression  Endocrine: no polyuria, polydipsia  Hem/lymph: no swelling      ALL OTHER SYSTEMS REVIEWED AND NEGATIVE      PHYSICAL EXAM:  VITALS: T(C): 36.6 (10-07-20 @ 09:01)  T(F): 97.9 (10-07-20 @ 09:01), Max: 98.5 (10-06-20 @ 20:53)  HR: 87 (10-07-20 @ 09:01) (80 - 105)  BP: 143/76 (10-07-20 @ 09:01) (123/80 - 155/82)  RR:  (18 - 18)  SpO2:  (99% - 100%)  Wt(kg): --  GENERAL: NAD, well-groomed, well-developed  EYES: No proptosis, no lid lag, anicteric  HEENT:  Atraumatic, Normocephalic, moist mucous membranes  THYROID: Normal size, no palpable nodules, no neck pain  RESPIRATORY: Clear to auscultation bilaterally; No rales, rhonchi, wheezing, or rubs  CARDIOVASCULAR: Regular rate and rhythm  GI: Soft, nontender, non distended, normal bowel sounds  SKIN: Dry, intact, No rashes or lesions  MUSCULOSKELETAL: Full range of motion, normal strength  NEURO: sensation intact, extraocular movements intact, no tremor, normal reflexes  PSYCH: Alert and oriented x 3, normal affect, normal mood      POCT Blood Glucose.: 160 mg/dL (10-07-20 @ 08:54)                            14.5   12.34 )-----------( 332      ( 07 Oct 2020 04:44 )             44.6       10-07    136  |  99  |  18  ----------------------------<  184<H>  4.8   |  23  |  0.62    EGFR if : 121  EGFR if non : 104    Ca    10.1      10-07  Mg     2.1     10-07  Phos  4.9     10-07    TPro  8.3  /  Alb  3.6  /  TBili  0.6  /  DBili  x   /  AST  14  /  ALT  16  /  AlkPhos  72  10-07      Thyroid Function Tests:  10-06 @ 21:15 TSH -- FreeT4 5.73 T3 266.3 Anti TPO -- Anti Thyroglobulin Ab -- TSI --  10-06 @ 18:25 TSH < 0.10 FreeT4 -- T3 275.3 Anti TPO -- Anti Thyroglobulin Ab -- TSI --                               HPI:  This is a 65M with history of HTN and DM2 who presents to the hospital with complaints of ~30 pounds weight loss over the past month. Said that he has noted a significant decrease in his appetite (currently eats minimal meals twice daily) and as a result has lost ~30 pounds over the past month. Has felt weak as a result but otherwise denied any other significant complaints. Said that people have told him that his arms seem to shake when he holds things but he has not noted any significant tremors or other thyroid complaints. Was following up with his PCP for his weight loss where he was noted to be very weak and was then sent to the hospital for further eval. Of note, patient had COVID-19 in March but states that he has fully recovered from that, denied any fevers/chills, URI complaints, or GI complaints. Does note occasional diaphoresis but said that these episodes seem to occur only when he takes APA or NSAIDS. Denies any dysphagia/odynophagia. Denies any depression/anxiety.     On arrival to the ED, his vitals were T 97.5, P 105, /75, RR 18, O2 sat 99% RA. His lab work showed leukocytosis (without neutrophilia) with mild anemia, suppressed TSH and elevated total T3 and free T4. He had a CT C/A/P with IV contrast that showed a 1.4cm L adrenal nodule but otherwise was negative for any other acute findings. ED spoke with endocrine overnight who recommended placing patient on metoprolol and he was given metoprolol 25mg PO x1. He was then admitted to medicine on telemetry.  (07 Oct 2020 04:40)    Endocrine History: 65 yr old M with PMH of DM2, no previous thyroid history here with 30 pound weight loss. Endocrine consulted for hyperthyroidism with TSH <0.1 TT3 266/275 FT4 5.73. Patient denies palpitations, diarrhea or heat intolerance but has been experiencing tremors. No FH of thyroid disease. No Hx of thyroid nodules. No neck pain or flu like symptoms. No cardiac history. Has never been on lithium or amiodarone. Received IV contrast for CT scan done for malignancy workup and was found to have a 1.4 cm indeterminate L adrenal nodule. No diaphoresis, headaches or flushing No proximal muscle weakness or easy bruising or bleeding. No uncontrolled hypertension, hyponatremia or hyperkalemia.   For DM2, no known complications. Takes Janumet 50-1000mg BID. Does not check FS at home. Follows a diet with high intake of crabs.         PAST MEDICAL & SURGICAL HISTORY:  HTN (hypertension)    Diabetes mellitus    No significant past surgical history        FAMILY HISTORY:  FH: hypothyroidism        Social History: nonsmoker, nondrinker    Outpatient Medications: Losartan, Janumet, Atorvastatin    MEDICATIONS  (STANDING):  dextrose 5%. 1000 milliLiter(s) (50 mL/Hr) IV Continuous <Continuous>  dextrose 50% Injectable 12.5 Gram(s) IV Push once  dextrose 50% Injectable 25 Gram(s) IV Push once  dextrose 50% Injectable 25 Gram(s) IV Push once  insulin lispro (HumaLOG) corrective regimen sliding scale   SubCutaneous three times a day before meals  insulin lispro (HumaLOG) corrective regimen sliding scale   SubCutaneous at bedtime  metoprolol tartrate 12.5 milliGRAM(s) Oral two times a day    MEDICATIONS  (PRN):  dextrose 40% Gel 15 Gram(s) Oral once PRN Blood Glucose LESS THAN 70 milliGRAM(s)/deciliter  glucagon  Injectable 1 milliGRAM(s) IntraMuscular once PRN Glucose LESS THAN 70 milligrams/deciliter      Allergies    No Known Allergies    Intolerances      Review of Systems:  Constitutional: No fever, +weight loss  Eyes: No blurry vision  Neuro: + tremors  HEENT: No pain  Cardiovascular: No chest pain, palpitations  Respiratory: No SOB, no cough  GI: No nausea, vomiting, abdominal pain  : No dysuria  Skin: no rash  Psych: no depression  Endocrine: no polyuria, polydipsia  Hem/lymph: no swelling      ALL OTHER SYSTEMS REVIEWED AND NEGATIVE      PHYSICAL EXAM:  VITALS: T(C): 36.6 (10-07-20 @ 09:01)  T(F): 97.9 (10-07-20 @ 09:01), Max: 98.5 (10-06-20 @ 20:53)  HR: 87 (10-07-20 @ 09:01) (80 - 105)  BP: 143/76 (10-07-20 @ 09:01) (123/80 - 155/82)  RR:  (18 - 18)  SpO2:  (99% - 100%)  Wt(kg): --  GENERAL: NAD, well-groomed, well-developed  EYES: No proptosis, no lid lag, anicteric  HEENT:  Atraumatic, Normocephalic, moist mucous membranes  THYROID: Normal size, no palpable nodules, no neck pain  RESPIRATORY: Clear to auscultation bilaterally; No rales, rhonchi, wheezing, or rubs  CARDIOVASCULAR: Regular rate and rhythm  GI: Soft, nontender, non distended, normal bowel sounds  SKIN: Dry, intact, No rashes or lesions  MUSCULOSKELETAL: Full range of motion, normal strength  NEURO: sensation intact, extraocular movements intact, no tremor, normal reflexes  PSYCH: Alert and oriented x 3, normal affect, normal mood      POCT Blood Glucose.: 160 mg/dL (10-07-20 @ 08:54)                            14.5   12.34 )-----------( 332      ( 07 Oct 2020 04:44 )             44.6       10-07    136  |  99  |  18  ----------------------------<  184<H>  4.8   |  23  |  0.62    EGFR if : 121  EGFR if non : 104    Ca    10.1      10-07  Mg     2.1     10-07  Phos  4.9     10-07    TPro  8.3  /  Alb  3.6  /  TBili  0.6  /  DBili  x   /  AST  14  /  ALT  16  /  AlkPhos  72  10-07      Thyroid Function Tests:  10-06 @ 21:15 TSH -- FreeT4 5.73 T3 266.3 Anti TPO -- Anti Thyroglobulin Ab -- TSI --  10-06 @ 18:25 TSH < 0.10 FreeT4 -- T3 275.3 Anti TPO -- Anti Thyroglobulin Ab -- TSI --

## 2020-10-07 NOTE — H&P ADULT - NSHPLABSRESULTS_GEN_ALL_CORE
LABS and ADDITIONAL STUDIES:                        11.8   16.69 )-----------( 417      ( 06 Oct 2020 18:25 )             36.5   Complete Blood Count + Automated Diff (10.06.20 @ 18:25)   Auto Neutrophil #: 12.51 K/uL   Auto Neutrophil %: 74.9 %     10-06    135  |  99  |  18  ----------------------------<  200<H>  4.3   |  24  |  0.71    Ca    9.6      06 Oct 2020 18:25    TPro  8.3  /  Alb  3.8  /  TBili  0.6  /  DBili  x   /  AST  13  /  ALT  16  /  AlkPhos  72  10-06    LIVER FUNCTIONS - ( 06 Oct 2020 18:25 )  Alb: 3.8 g/dL / Pro: 8.3 g/dL / ALK PHOS: 72 u/L / ALT: 16 u/L / AST: 13 u/L / GGT: x           Thyroid Stimulating Hormone, Serum (10.06.20 @ 18:25)   Thyroid Stimulating Hormone, Serum: < 0.10 uIU/mL   Free Thyroxine, Serum in AM (10.06.20 @ 21:15)   Free Thyroxine, Serum: 5.73 ng/dL   Triiodothyronine, Total (T3 Total) (10.06.20 @ 21:15)   Triiodothyronine, Total (T3 Total): 266.3 ng/dL     Lactate - 1.5    < from: CT Abdomen and Pelvis w/ IV Cont (10.06.20 @ 20:29) >    FINDINGS:  CHEST:  LUNGS AND LARGE AIRWAYS: Patent central airways. No pulmonary nodules.  PLEURA: No pleural effusion.  VESSELS: Coronary atherosclerotic calcifications.  HEART: Heart size is normal. No pericardial effusion.  MEDIASTINUM AND NINOSKA: No lymphadenopathy.  CHEST WALL AND LOWERNECK: Unremarkable.    ABDOMEN AND PELVIS:  LIVER: Within normal limits.  BILE DUCTS: Normal caliber.  GALLBLADDER: Within normal limits.  SPLEEN: Within normal limits.  PANCREAS: Within normal limits.  ADRENALS: 1.4 cm indeterminant left adrenal gland nodule.  KIDNEYS/URETERS: Within normal limits.    BLADDER: Within normal limits.  REPRODUCTIVE ORGANS: Prostate is enlarged.    BOWEL: No bowel obstruction. Appendix is normal.  PERITONEUM: No ascites.  VESSELS: Within normal limits.  RETROPERITONEUM/LYMPH NODES: No lymphadenopathy.  ABDOMINAL WALL: Within normal limits.  BONES: Degenerative changes.    IMPRESSION:  1.4 cm indeterminate left adrenal gland nodule. Follow-up nonemergent adrenal protocol CT or MRI can be performed for further evaluation.    < end of copied text >    EKG - LABS and ADDITIONAL STUDIES:                        11.8   16.69 )-----------( 417      ( 06 Oct 2020 18:25 )             36.5   Complete Blood Count + Automated Diff (10.06.20 @ 18:25)   Auto Neutrophil #: 12.51 K/uL   Auto Neutrophil %: 74.9 %     10-06    135  |  99  |  18  ----------------------------<  200<H>  4.3   |  24  |  0.71    Ca    9.6      06 Oct 2020 18:25    TPro  8.3  /  Alb  3.8  /  TBili  0.6  /  DBili  x   /  AST  13  /  ALT  16  /  AlkPhos  72  10-06    LIVER FUNCTIONS - ( 06 Oct 2020 18:25 )  Alb: 3.8 g/dL / Pro: 8.3 g/dL / ALK PHOS: 72 u/L / ALT: 16 u/L / AST: 13 u/L / GGT: x           Thyroid Stimulating Hormone, Serum (10.06.20 @ 18:25)   Thyroid Stimulating Hormone, Serum: < 0.10 uIU/mL   Free Thyroxine, Serum in AM (10.06.20 @ 21:15)   Free Thyroxine, Serum: 5.73 ng/dL   Triiodothyronine, Total (T3 Total) (10.06.20 @ 21:15)   Triiodothyronine, Total (T3 Total): 266.3 ng/dL     Lactate - 1.5    < from: CT Abdomen and Pelvis w/ IV Cont (10.06.20 @ 20:29) >    FINDINGS:  CHEST:  LUNGS AND LARGE AIRWAYS: Patent central airways. No pulmonary nodules.  PLEURA: No pleural effusion.  VESSELS: Coronary atherosclerotic calcifications.  HEART: Heart size is normal. No pericardial effusion.  MEDIASTINUM AND NINOSKA: No lymphadenopathy.  CHEST WALL AND LOWERNECK: Unremarkable.    ABDOMEN AND PELVIS:  LIVER: Within normal limits.  BILE DUCTS: Normal caliber.  GALLBLADDER: Within normal limits.  SPLEEN: Within normal limits.  PANCREAS: Within normal limits.  ADRENALS: 1.4 cm indeterminant left adrenal gland nodule.  KIDNEYS/URETERS: Within normal limits.    BLADDER: Within normal limits.  REPRODUCTIVE ORGANS: Prostate is enlarged.    BOWEL: No bowel obstruction. Appendix is normal.  PERITONEUM: No ascites.  VESSELS: Within normal limits.  RETROPERITONEUM/LYMPH NODES: No lymphadenopathy.  ABDOMINAL WALL: Within normal limits.  BONES: Degenerative changes.    IMPRESSION:  1.4 cm indeterminate left adrenal gland nodule. Follow-up nonemergent adrenal protocol CT or MRI can be performed for further evaluation.    < end of copied text >    EKG - NSR at 98, QTc 436, no significant ST-T wave changes

## 2020-10-08 LAB
ANION GAP SERPL CALC-SCNC: 12 MMO/L — SIGNIFICANT CHANGE UP (ref 7–14)
BUN SERPL-MCNC: 16 MG/DL — SIGNIFICANT CHANGE UP (ref 7–23)
CALCIUM SERPL-MCNC: 9.7 MG/DL — SIGNIFICANT CHANGE UP (ref 8.4–10.5)
CHLORIDE SERPL-SCNC: 99 MMOL/L — SIGNIFICANT CHANGE UP (ref 98–107)
CO2 SERPL-SCNC: 25 MMOL/L — SIGNIFICANT CHANGE UP (ref 22–31)
CREAT SERPL-MCNC: 0.67 MG/DL — SIGNIFICANT CHANGE UP (ref 0.5–1.3)
GLUCOSE BLDC GLUCOMTR-MCNC: 198 MG/DL — HIGH (ref 70–99)
GLUCOSE BLDC GLUCOMTR-MCNC: 256 MG/DL — HIGH (ref 70–99)
GLUCOSE BLDC GLUCOMTR-MCNC: 265 MG/DL — HIGH (ref 70–99)
GLUCOSE BLDC GLUCOMTR-MCNC: 351 MG/DL — HIGH (ref 70–99)
GLUCOSE SERPL-MCNC: 189 MG/DL — HIGH (ref 70–99)
HBA1C BLD-MCNC: 7.7 % — HIGH (ref 4–5.6)
HCT VFR BLD CALC: 36.1 % — LOW (ref 39–50)
HCV AB S/CO SERPL IA: 0.21 S/CO — SIGNIFICANT CHANGE UP (ref 0–0.99)
HCV AB SERPL-IMP: SIGNIFICANT CHANGE UP
HGB BLD-MCNC: 11.6 G/DL — LOW (ref 13–17)
MAGNESIUM SERPL-MCNC: 2 MG/DL — SIGNIFICANT CHANGE UP (ref 1.6–2.6)
MCHC RBC-ENTMCNC: 30.5 PG — SIGNIFICANT CHANGE UP (ref 27–34)
MCHC RBC-ENTMCNC: 32.1 % — SIGNIFICANT CHANGE UP (ref 32–36)
MCV RBC AUTO: 95 FL — SIGNIFICANT CHANGE UP (ref 80–100)
NRBC # FLD: 0 K/UL — SIGNIFICANT CHANGE UP (ref 0–0)
PHOSPHATE SERPL-MCNC: 4.3 MG/DL — SIGNIFICANT CHANGE UP (ref 2.5–4.5)
PLATELET # BLD AUTO: 436 K/UL — HIGH (ref 150–400)
PMV BLD: 10.2 FL — SIGNIFICANT CHANGE UP (ref 7–13)
POTASSIUM SERPL-MCNC: 4.2 MMOL/L — SIGNIFICANT CHANGE UP (ref 3.5–5.3)
POTASSIUM SERPL-SCNC: 4.2 MMOL/L — SIGNIFICANT CHANGE UP (ref 3.5–5.3)
RBC # BLD: 3.8 M/UL — LOW (ref 4.2–5.8)
RBC # FLD: 10.8 % — SIGNIFICANT CHANGE UP (ref 10.3–14.5)
SODIUM SERPL-SCNC: 136 MMOL/L — SIGNIFICANT CHANGE UP (ref 135–145)
T3 SERPL-MCNC: 245.7 NG/DL — HIGH (ref 80–200)
WBC # BLD: 12.44 K/UL — HIGH (ref 3.8–10.5)
WBC # FLD AUTO: 12.44 K/UL — HIGH (ref 3.8–10.5)

## 2020-10-08 PROCEDURE — 99233 SBSQ HOSP IP/OBS HIGH 50: CPT

## 2020-10-08 RX ORDER — INSULIN GLARGINE 100 [IU]/ML
6 INJECTION, SOLUTION SUBCUTANEOUS AT BEDTIME
Refills: 0 | Status: DISCONTINUED | OUTPATIENT
Start: 2020-10-08 | End: 2020-10-09

## 2020-10-08 RX ORDER — INSULIN LISPRO 100/ML
2 VIAL (ML) SUBCUTANEOUS
Refills: 0 | Status: DISCONTINUED | OUTPATIENT
Start: 2020-10-08 | End: 2020-10-09

## 2020-10-08 RX ADMIN — Medication 5: at 11:45

## 2020-10-08 RX ADMIN — Medication 25 MILLIGRAM(S): at 17:29

## 2020-10-08 RX ADMIN — INSULIN GLARGINE 6 UNIT(S): 100 INJECTION, SOLUTION SUBCUTANEOUS at 21:42

## 2020-10-08 RX ADMIN — Medication 650 MILLIGRAM(S): at 20:24

## 2020-10-08 RX ADMIN — LOSARTAN POTASSIUM 50 MILLIGRAM(S): 100 TABLET, FILM COATED ORAL at 05:29

## 2020-10-08 RX ADMIN — Medication 650 MILLIGRAM(S): at 21:49

## 2020-10-08 RX ADMIN — Medication 3: at 16:46

## 2020-10-08 RX ADMIN — Medication 1: at 07:28

## 2020-10-08 RX ADMIN — Medication 25 MILLIGRAM(S): at 05:29

## 2020-10-08 RX ADMIN — Medication 1: at 21:49

## 2020-10-08 RX ADMIN — Medication 2 UNIT(S): at 16:46

## 2020-10-08 NOTE — PROGRESS NOTE ADULT - ATTENDING COMMENTS
Kelly Webb (pager [phone])  On evenings and weekends, please call [phone] or page endocrine fellow on call.   Please note that this patient may be followed by different provider tomorrow. If no answer, contact endocrine fellow on call.

## 2020-10-08 NOTE — PROGRESS NOTE ADULT - SUBJECTIVE AND OBJECTIVE BOX
CHIEF COMPLAINT: patient condition improving his appetite also improving  endocrine follow-up appreciated he was seen earlier today.      SUBJECTIVE:     REVIEW OF SYSTEMS:    CONSTITUTIONAL: No fevers or chills; +generalized weakness, +~30 lbs weight loss (unintentional)  EYES: No visual changes or eye discharge  ENT: No rhinorrhea or sore throat  NECK: No pain or stiffness  RESPIRATORY: No cough, wheezing, hemoptysis; No shortness of breath  CARDIOVASCULAR: No chest pain or palpitations; No lower extremity edema  GASTROINTESTINAL: No abdominal or epigastric pain. No nausea, vomiting, or hematemesis; No diarrhea or constipation. No melena or hematochezia.  BACK: No back pain, no flank pain  GENITOURINARY: No dysuria, frequency or hematuria  NEUROLOGICAL: No numbness or weakness  SKIN: No itching, burning, rashes, or lesions               Vital Signs Last 24 Hrs  T(C): 36.9 (08 Oct 2020 21:41), Max: 37 (08 Oct 2020 05:25)  T(F): 98.4 (08 Oct 2020 21:41), Max: 98.6 (08 Oct 2020 05:25)  HR: 84 (08 Oct 2020 21:41) (80 - 110)  BP: 125/65 (08 Oct 2020 21:41) (116/68 - 140/75)  BP(mean): --  RR: 18 (08 Oct 2020 21:41) (17 - 18)  SpO2: 100% (08 Oct 2020 21:41) (100% - 100%)    I&O's Summary      CAPILLARY BLOOD GLUCOSE      POCT Blood Glucose.: 265 mg/dL (08 Oct 2020 21:38)  POCT Blood Glucose.: 256 mg/dL (08 Oct 2020 16:40)  POCT Blood Glucose.: 351 mg/dL (08 Oct 2020 11:16)  POCT Blood Glucose.: 198 mg/dL (08 Oct 2020 07:26)      PHYSICAL EXAM:  GENERAL: No acute distress, mildly cachectic  ENT: EOMI, PERRL, conjunctiva and sclera clear, Neck supple, No JVD, moist mucosa  CHEST/LUNG: Clear to auscultation bilaterally; No wheeze, equal breath sounds bilaterally   BACK: No spinal tenderness, no CVA tenderness  HEART: Regular rate and rhythm; No murmurs, rubs, or gallops  ABDOMEN: Soft, Nontender, Nondistended; Bowel sounds present  EXTREMITIES:  No clubbing, cyanosis, or edema  PSYCH: Nl behavior, nl affect  NEUROLOGY: AAOx3, non-focal, cranial nerves intact  SKIN: Normal color, No rashes      MEDICATIONS:  MEDICATIONS  (STANDING):  dextrose 5%. 1000 milliLiter(s) (50 mL/Hr) IV Continuous <Continuous>  dextrose 50% Injectable 12.5 Gram(s) IV Push once  dextrose 50% Injectable 25 Gram(s) IV Push once  dextrose 50% Injectable 25 Gram(s) IV Push once  insulin glargine Injectable (LANTUS) 6 Unit(s) SubCutaneous at bedtime  insulin lispro (HumaLOG) corrective regimen sliding scale   SubCutaneous three times a day before meals  insulin lispro (HumaLOG) corrective regimen sliding scale   SubCutaneous at bedtime  insulin lispro Injectable (HumaLOG) 2 Unit(s) SubCutaneous three times a day before meals  losartan 50 milliGRAM(s) Oral daily  methimazole 10 milliGRAM(s) Oral every 8 hours  metoprolol tartrate 25 milliGRAM(s) Oral two times a day      LABS: All Labs Reviewed:                        11.6   12.44 )-----------( 436      ( 08 Oct 2020 06:00 )             36.1     10-08    136  |  99  |  16  ----------------------------<  189<H>  4.2   |  25  |  0.67    Ca    9.7      08 Oct 2020 06:00  Phos  4.3     10-08  Mg     2.0     10-08    TPro  8.3  /  Alb  3.6  /  TBili  0.6  /  DBili  x   /  AST  14  /  ALT  16  /  AlkPhos  72  10-07          Blood Culture:   Urine Culture      RADIOLOGY/EKG:    ASSESSMENT AND PLAN:  This is a 65M with history of HTN and DM2 who presents to the hospital with an unintentional weight loss found to have hyperthyroidism.       Problem/Plan - 1:  ·  Problem: Hyperthyroidism.  Plan: - Patient with suppressed TSH with elevated free T4 and total T3, concern for autoimmune thyroiditis but thyroid gland not significant enlarged and no TTP on palpation of the thyroid gland, no nodules felt  - Would check TPO antibodies  - Will place on metoprolol 12.5mg PO BID  -   endocrine consult appreciated        Problem/Plan - 2:  ·  Problem: Weight loss, unintentional.  Plan: - Likely in setting of his hyperthyroidism  - Patient states that his PCP was worried about malignancy and had therefore sent him in for further eval, CT C/A/P here with 1.4cm adrenal nodule but likely incidentaloma, unlikely to be a cause of patient's symptoms  - Would likely need outpatient follow up for the L adrenal incidentaloma  -10/8/2020 started on methimazole 30 mg daily monitor CBC closely   .     Problem/Plan - 3:  ·  Problem: SIRS (systemic inflammatory response syndrome).  Plan: - Unclear cause of leukocytosis/tachycardia, possibly related to hyperthyroidism   - Patient denies any infectious complaints, CT C/A/P without infectious findings  - Will monitor off abx for now  - no fevers currently, if spikes then would consider BCx/UCx.   -10/7/2020 improving his leukocytosis    Problem/Plan - 4:  ·  Problem: Type 2 diabetes mellitus without complication, without long-term current use of insulin.  Plan: - On janumet, will hold for now  - HISS, FS qAC, diabetic diet  - c/w atorvastatin.     Problem/Plan - 5:  ·  Problem: Essential hypertension.   continue Lopressor 25 mg    Problem/Plan - 6:  Problem: Need for prophylactic measure. Plan: DVT ppx - SCDs  Activity - Ambulate as tolerated  DVT PPX:    ADVANCED DIRECTIVE:    DISPOSITION:

## 2020-10-08 NOTE — PROGRESS NOTE ADULT - PROBLEM SELECTOR PLAN 1
-Will f/u TSHr Ab and TSI  -No nodules noted on Thyroid US  -PLEASE AVOID FURTHER IV CONTRAST  -Recommend continuation of metoprolol 25 mg BID (titrate to HR<90)  -c/w methimazole 30mg daily (check daily LFTs and CBC to monitor for agranulocytosis and liver dysfunction)  -Check repeat Free T4 and Total T3 tomorrow  -Will need further evaluation as outpt including NM scan (will need to wait 6 weeks after IV contrast).

## 2020-10-08 NOTE — PROGRESS NOTE ADULT - PROBLEM SELECTOR PLAN 2
Check Plasma Renin/Aldosterone as well as plasma metanephrine  Dex suppression test as outpt  Would repeat MRI (adrenal protocol) WITHOUT CONTRAST for further evaluation of adrenal nodules (with chemical shift imaging to assess for malignancy)

## 2020-10-08 NOTE — PROGRESS NOTE ADULT - SUBJECTIVE AND OBJECTIVE BOX
Chief Complaint:     History:    MEDICATIONS  (STANDING):  dextrose 5%. 1000 milliLiter(s) (50 mL/Hr) IV Continuous <Continuous>  dextrose 50% Injectable 12.5 Gram(s) IV Push once  dextrose 50% Injectable 25 Gram(s) IV Push once  dextrose 50% Injectable 25 Gram(s) IV Push once  insulin glargine Injectable (LANTUS) 6 Unit(s) SubCutaneous at bedtime  insulin lispro (HumaLOG) corrective regimen sliding scale   SubCutaneous three times a day before meals  insulin lispro (HumaLOG) corrective regimen sliding scale   SubCutaneous at bedtime  insulin lispro Injectable (HumaLOG) 2 Unit(s) SubCutaneous three times a day before meals  losartan 50 milliGRAM(s) Oral daily  methimazole 10 milliGRAM(s) Oral every 8 hours  metoprolol tartrate 25 milliGRAM(s) Oral two times a day    MEDICATIONS  (PRN):  acetaminophen   Tablet .. 650 milliGRAM(s) Oral every 6 hours PRN Temp greater or equal to 38C (100.4F), Mild Pain (1 - 3)  dextrose 40% Gel 15 Gram(s) Oral once PRN Blood Glucose LESS THAN 70 milliGRAM(s)/deciliter  glucagon  Injectable 1 milliGRAM(s) IntraMuscular once PRN Glucose LESS THAN 70 milligrams/deciliter      Allergies    No Known Allergies    Intolerances      Review of Systems:  Constitutional: No fever  Eyes: No blurry vision  Neuro: No tremors  HEENT: No pain  Cardiovascular: No chest pain, palpitations  Respiratory: No SOB, no cough  GI: No nausea, vomiting, abdominal pain  : No dysuria  Skin: no rash  Psych: no depression  Endocrine: no polyuria, polydipsia      ALL OTHER SYSTEMS REVIEWED AND NEGATIVE        PHYSICAL EXAM:  VITALS: T(C): 36.8 (10-08-20 @ 13:18)  T(F): 98.2 (10-08-20 @ 13:18), Max: 98.6 (10-08-20 @ 05:25)  HR: 110 (10-08-20 @ 13:18) (80 - 110)  BP: 125/68 (10-08-20 @ 13:18) (116/68 - 142/68)  RR:  (17 - 20)  SpO2:  (97% - 100%)  Wt(kg): --  GENERAL: NAD, well-groomed, well-developed  EYES: No proptosis, no lid lag, anicteric  HEENT:  Atraumatic, Normocephalic, moist mucous membranes  RESPIRATORY: Clear to auscultation bilaterally; No rales, rhonchi, wheezing, or rubs  CARDIOVASCULAR: Regular rate and rhythm  GI: Soft, nontender, non distended, normal bowel sounds  SKIN: Dry, intact, No rashes or lesions  PSYCH: Alert and oriented x 3, normal affect, normal mood      POCT Blood Glucose.: 351 mg/dL (10-08-20 @ 11:16)  POCT Blood Glucose.: 198 mg/dL (10-08-20 @ 07:26)  POCT Blood Glucose.: 223 mg/dL (10-07-20 @ 22:18)  POCT Blood Glucose.: 230 mg/dL (10-07-20 @ 17:14)  POCT Blood Glucose.: 263 mg/dL (10-07-20 @ 11:42)  POCT Blood Glucose.: 160 mg/dL (10-07-20 @ 08:54)      10-08    136  |  99  |  16  ----------------------------<  189<H>  4.2   |  25  |  0.67    EGFR if : 117  EGFR if non : 101    Ca    9.7      10-08  Mg     2.0     10-08  Phos  4.3     10-08    TPro  8.3  /  Alb  3.6  /  TBili  0.6  /  DBili  x   /  AST  14  /  ALT  16  /  AlkPhos  72  10-07          Thyroid Function Tests:  10-08 @ 06:00 TSH -- FreeT4 -- T3 245.7 Anti TPO -- Anti Thyroglobulin Ab -- TSI --  10-07 @ 04:44 TSH -- FreeT4 -- T3 -- Anti TPO <10.0 Anti Thyroglobulin Ab -- TSI --

## 2020-10-08 NOTE — PROGRESS NOTE ADULT - PROBLEM SELECTOR PLAN 3
CHO diet and FS AC and HS  A1C 7.7  Start Lantus 6 Units HS and Humalog 2 Units TIDAC plus low scale   Patient will need follow up appt at Endocrine Office [address] [place] [address] [phone].

## 2020-10-08 NOTE — PROGRESS NOTE ADULT - ASSESSMENT
65 yr old M with Hx of DM2 A1C pending, no previous history of thyroid disorder here with weight loss found to have hyperthyroidism (not in thyroid storm) after receiving IV contrast and  found to have indeterminate L adrenal nodule.   DD for hyperthyroidism includes Graves Disease, (less likely thyroiditis) exacerbated by administration of IV contrast  TT3 is trending down [id number]

## 2020-10-09 LAB
ANION GAP SERPL CALC-SCNC: 13 MMO/L — SIGNIFICANT CHANGE UP (ref 7–14)
BUN SERPL-MCNC: 15 MG/DL — SIGNIFICANT CHANGE UP (ref 7–23)
CALCIUM SERPL-MCNC: 9.9 MG/DL — SIGNIFICANT CHANGE UP (ref 8.4–10.5)
CHLORIDE SERPL-SCNC: 101 MMOL/L — SIGNIFICANT CHANGE UP (ref 98–107)
CO2 SERPL-SCNC: 23 MMOL/L — SIGNIFICANT CHANGE UP (ref 22–31)
CREAT SERPL-MCNC: 0.65 MG/DL — SIGNIFICANT CHANGE UP (ref 0.5–1.3)
GLUCOSE BLDC GLUCOMTR-MCNC: 151 MG/DL — HIGH (ref 70–99)
GLUCOSE BLDC GLUCOMTR-MCNC: 189 MG/DL — HIGH (ref 70–99)
GLUCOSE BLDC GLUCOMTR-MCNC: 195 MG/DL — HIGH (ref 70–99)
GLUCOSE BLDC GLUCOMTR-MCNC: 406 MG/DL — HIGH (ref 70–99)
GLUCOSE SERPL-MCNC: 191 MG/DL — HIGH (ref 70–99)
HCT VFR BLD CALC: 36 % — LOW (ref 39–50)
HGB BLD-MCNC: 11.4 G/DL — LOW (ref 13–17)
MAGNESIUM SERPL-MCNC: 2 MG/DL — SIGNIFICANT CHANGE UP (ref 1.6–2.6)
MCHC RBC-ENTMCNC: 30.4 PG — SIGNIFICANT CHANGE UP (ref 27–34)
MCHC RBC-ENTMCNC: 31.7 % — LOW (ref 32–36)
MCV RBC AUTO: 96 FL — SIGNIFICANT CHANGE UP (ref 80–100)
NRBC # FLD: 0 K/UL — SIGNIFICANT CHANGE UP (ref 0–0)
PHOSPHATE SERPL-MCNC: 3.5 MG/DL — SIGNIFICANT CHANGE UP (ref 2.5–4.5)
PLATELET # BLD AUTO: 438 K/UL — HIGH (ref 150–400)
PMV BLD: 10.3 FL — SIGNIFICANT CHANGE UP (ref 7–13)
POTASSIUM SERPL-MCNC: 4.2 MMOL/L — SIGNIFICANT CHANGE UP (ref 3.5–5.3)
POTASSIUM SERPL-SCNC: 4.2 MMOL/L — SIGNIFICANT CHANGE UP (ref 3.5–5.3)
RBC # BLD: 3.75 M/UL — LOW (ref 4.2–5.8)
RBC # FLD: 11 % — SIGNIFICANT CHANGE UP (ref 10.3–14.5)
SODIUM SERPL-SCNC: 137 MMOL/L — SIGNIFICANT CHANGE UP (ref 135–145)
T3 SERPL-MCNC: 235.5 NG/DL — HIGH (ref 80–200)
T4 FREE SERPL-MCNC: 4.68 NG/DL — HIGH (ref 0.9–1.8)
WBC # BLD: 13.42 K/UL — HIGH (ref 3.8–10.5)
WBC # FLD AUTO: 13.42 K/UL — HIGH (ref 3.8–10.5)

## 2020-10-09 PROCEDURE — 74181 MRI ABDOMEN W/O CONTRAST: CPT | Mod: 26

## 2020-10-09 PROCEDURE — 99232 SBSQ HOSP IP/OBS MODERATE 35: CPT

## 2020-10-09 RX ORDER — INSULIN GLARGINE 100 [IU]/ML
10 INJECTION, SOLUTION SUBCUTANEOUS AT BEDTIME
Refills: 0 | Status: DISCONTINUED | OUTPATIENT
Start: 2020-10-09 | End: 2020-10-10

## 2020-10-09 RX ORDER — INSULIN LISPRO 100/ML
6 VIAL (ML) SUBCUTANEOUS
Refills: 0 | Status: DISCONTINUED | OUTPATIENT
Start: 2020-10-09 | End: 2020-10-10

## 2020-10-09 RX ADMIN — Medication 1: at 07:28

## 2020-10-09 RX ADMIN — Medication 2 UNIT(S): at 11:36

## 2020-10-09 RX ADMIN — Medication 650 MILLIGRAM(S): at 20:25

## 2020-10-09 RX ADMIN — Medication 25 MILLIGRAM(S): at 17:12

## 2020-10-09 RX ADMIN — Medication 6: at 11:36

## 2020-10-09 RX ADMIN — Medication 1: at 17:10

## 2020-10-09 RX ADMIN — INSULIN GLARGINE 10 UNIT(S): 100 INJECTION, SOLUTION SUBCUTANEOUS at 21:57

## 2020-10-09 RX ADMIN — Medication 25 MILLIGRAM(S): at 05:27

## 2020-10-09 RX ADMIN — Medication 2 UNIT(S): at 07:27

## 2020-10-09 RX ADMIN — Medication 650 MILLIGRAM(S): at 19:26

## 2020-10-09 RX ADMIN — Medication 6 UNIT(S): at 17:10

## 2020-10-09 RX ADMIN — LOSARTAN POTASSIUM 50 MILLIGRAM(S): 100 TABLET, FILM COATED ORAL at 05:27

## 2020-10-09 NOTE — PROGRESS NOTE ADULT - SUBJECTIVE AND OBJECTIVE BOX
CHIEF COMPLAINT: pt was not seen  he  was  in MRI  as per RN he was stable    SUBJECTIVE:     REVIEW OF SYSTEMS:    CONSTITUTIONAL: (  )  weakness,  (  ) fevers or chills  EYES/ENT: (  )visual changes;     NECK: (  ) pain or stiffness  RESPIRATORY:   (  )cough, wheezing, hemoptysis;  (  ) shortness of breath  CARDIOVASCULAR:  (  )chest pain or palpitations  GASTROINTESTINAL:   (  )abdominal or epigastric pain.  (  ) nausea, vomiting, or hematemesis;   (   ) diarrhea or constipation.   GENITOURINARY:   (    ) dysuria, frequency or hematuria  NEUROLOGICAL:  (   ) numbness or weakness   All other review of systems is negative unless indicated above    Vital Signs Last 24 Hrs  T(C): 36.9 (09 Oct 2020 13:14), Max: 36.9 (08 Oct 2020 21:41)  T(F): 98.4 (09 Oct 2020 13:14), Max: 98.4 (08 Oct 2020 21:41)  HR: 87 (09 Oct 2020 13:14) (77 - 96)  BP: 137/71 (09 Oct 2020 13:14) (125/65 - 142/72)  BP(mean): --  RR: 18 (09 Oct 2020 13:14) (16 - 18)  SpO2: 100% (09 Oct 2020 13:14) (98% - 100%)    I&O's Summary      CAPILLARY BLOOD GLUCOSE      POCT Blood Glucose.: 406 mg/dL (09 Oct 2020 11:10)  POCT Blood Glucose.: 195 mg/dL (09 Oct 2020 07:24)  POCT Blood Glucose.: 265 mg/dL (08 Oct 2020 21:38)  POCT Blood Glucose.: 256 mg/dL (08 Oct 2020 16:40)      PHYSICAL EXAM:    Constitutional:  (   ) NAD,   (   )awake and alert  HEENT: PERR, EOMI,    Neck: Soft and supple, No LAD, No JVD  Respiratory:  (    Breath sounds are clear bilaterally,    (   ) wheezing, rales or rhonchi  Cardiovascular:     (   )S1 and S2, regular rate and rhythm, no Murmurs, gallops or rubs  Gastrointestinal:  (   )Bowel Sounds present, soft,   (  )nontender, nondistended,    Extremities:    (  ) peripheral edema  Vascular: 2+ peripheral pulses  Neurological:    (    )A/O x 3,   (  ) focal deficits  Musculoskeletal:    (   )  normal strength b/l upper  (     ) normal  lower extremities  Skin: No rashes    MEDICATIONS:  MEDICATIONS  (STANDING):  dextrose 5%. 1000 milliLiter(s) (50 mL/Hr) IV Continuous <Continuous>  dextrose 50% Injectable 12.5 Gram(s) IV Push once  dextrose 50% Injectable 25 Gram(s) IV Push once  dextrose 50% Injectable 25 Gram(s) IV Push once  insulin glargine Injectable (LANTUS) 6 Unit(s) SubCutaneous at bedtime  insulin lispro (HumaLOG) corrective regimen sliding scale   SubCutaneous three times a day before meals  insulin lispro (HumaLOG) corrective regimen sliding scale   SubCutaneous at bedtime  insulin lispro Injectable (HumaLOG) 2 Unit(s) SubCutaneous three times a day before meals  losartan 50 milliGRAM(s) Oral daily  methimazole 10 milliGRAM(s) Oral every 8 hours  metoprolol tartrate 25 milliGRAM(s) Oral two times a day      LABS: All Labs Reviewed:                        11.4   13.42 )-----------( 438      ( 09 Oct 2020 05:54 )             36.0     10-09    137  |  101  |  15  ----------------------------<  191<H>  4.2   |  23  |  0.65    Ca    9.9      09 Oct 2020 05:54  Phos  3.5     10-09  Mg     2.0     10-09            Blood Culture:   Urine Culture      RADIOLOGY/EKG:    ASSESSMENT AND PLAN:  waiting for  MRI  and ENDO follow recommendation.     DVT PPX:    ADVANCED DIRECTIVE:    DISPOSITION:

## 2020-10-09 NOTE — PROGRESS NOTE ADULT - REASON FOR ADMISSION
Unintentional weight loss

## 2020-10-09 NOTE — PROGRESS NOTE ADULT - ASSESSMENT
65 yr old M with Hx of DM2 A1C 7.7%, no previous history of thyroid disorder here with weight loss found to have hyperthyroidism (not in thyroid storm) after receiving IV contrast and  found to have indeterminate L adrenal nodule.   DD for hyperthyroidism includes Graves DIease vs Toxic nodules (less likely thyroiditis) exacerbated by administration of IV contrast      Hyperthyroidism.   ( Thyroiditis vs. Graves disease)   -Thyroid ultrasound not showing any discrete nodules, not a hypervascular gland   -PLEASE AVOID FURTHER IV CONTRAST  -Continue metoprolol 25 mg BID   -Continue methimazole 30mg daily   -Will need to check CBC with diff and LFTs   -Pending results of TSH rab and TSI   -Free T4 is trending down   -Will need to follow up with Endocrine outpatient, likely is thyroiditis but accurate diagnosis after availability of ab testing       Adrenal nodule  -Check Plasma Renin/Aldosterone as well as plasma metanephrine  -Dex suppression test as outpt  -Would repeat MRI (adrenal protocol) WITHOUT CONTRAST for further evaluation of adrenal nodules (with chemical shift imaging to assess for malignancy)-can be done inpt vs outpt.    Type 2 diabetes mellitus with hyperglycemia, without long-term current use of insulin    -Increase Lantus to 10 units and Humalog to 6 units TID with meals   -CHO diet and FS AC and HS  -HgA1C of 7.7%  -Can keep on low scale before meals and bedtime for now  -Endocrine will follow    -Patient will need follow up appt at Endocrine Office [address] [place] [address] [phone].-D/C recs will be decided closer to discharge date    65 yr old M with Hx of DM2 A1C 7.7%, no previous history of thyroid disorder here with weight loss found to have hyperthyroidism (not in thyroid storm) after receiving IV contrast and  found to have indeterminate L adrenal nodule.   DD for hyperthyroidism includes Graves DIease vs Toxic nodules (less likely thyroiditis) exacerbated by administration of IV contrast      Hyperthyroidism.   ( Thyroiditis vs. Graves disease)   -Thyroid ultrasound not showing any discrete nodules, not a hypervascular gland   -PLEASE AVOID FURTHER IV CONTRAST  -Continue metoprolol 25 mg BID   -Continue methimazole 30mg daily   -Will need to check CBC with diff and LFTs   -Pending results of TSH rab and TSI   -Free T4 is trending down   -Will need to follow up with Endocrine outpatient, likely is thyroiditis but accurate diagnosis after availability of ab testing       Adrenal nodule  -Check Plasma Renin/Aldosterone as well as plasma metanephrine  -Dex suppression test as outpt  -1.2 x 1.2 cm left adrenal nodule demonstrates signal dropout between in and out of phase images, typical for adrenal adenoma. A smaller adrenal adenoma in the lateral limb of the left adrenal gland measures 1.0 x 0.4 cm.   -Can be followed up outpatient       Type 2 diabetes mellitus with hyperglycemia, without long-term current use of insulin    -Increase Lantus to 10 units and Humalog to 6 units TID with meals   -CHO diet and FS AC and HS  -HgA1C of 7.7%  -Can keep on low scale before meals and bedtime for now  -Endocrine will follow    -Patient will need follow up appt at Endocrine Office [address] [place] [address] [phone].-D/C recs will be decided closer to discharge date

## 2020-10-09 NOTE — PROGRESS NOTE ADULT - SUBJECTIVE AND OBJECTIVE BOX
History:  No acute overnight events. Patient denied any heart palpitations, SOB or chest pain at this time.   Reported some improvement in his appetite       MEDICATIONS  (STANDING):  dextrose 5%. 1000 milliLiter(s) (50 mL/Hr) IV Continuous <Continuous>  dextrose 50% Injectable 12.5 Gram(s) IV Push once  dextrose 50% Injectable 25 Gram(s) IV Push once  dextrose 50% Injectable 25 Gram(s) IV Push once  insulin glargine Injectable (LANTUS) 6 Unit(s) SubCutaneous at bedtime  insulin lispro (HumaLOG) corrective regimen sliding scale   SubCutaneous three times a day before meals  insulin lispro (HumaLOG) corrective regimen sliding scale   SubCutaneous at bedtime  insulin lispro Injectable (HumaLOG) 2 Unit(s) SubCutaneous three times a day before meals  losartan 50 milliGRAM(s) Oral daily  methimazole 10 milliGRAM(s) Oral every 8 hours  metoprolol tartrate 25 milliGRAM(s) Oral two times a day    MEDICATIONS  (PRN):  acetaminophen   Tablet .. 650 milliGRAM(s) Oral every 6 hours PRN Temp greater or equal to 38C (100.4F), Mild Pain (1 - 3)  dextrose 40% Gel 15 Gram(s) Oral once PRN Blood Glucose LESS THAN 70 milliGRAM(s)/deciliter  glucagon  Injectable 1 milliGRAM(s) IntraMuscular once PRN Glucose LESS THAN 70 milligrams/deciliter      Allergies    No Known Allergies    Intolerances      Review of Systems:  Constitutional: No fever  Eyes: No blurry vision  Neuro: No tremors  HEENT: No pain  Cardiovascular: No chest pain, palpitations  Respiratory: No SOB, no cough  GI: No nausea, vomiting, abdominal pain  : No dysuria  Skin: no rash  Psych: no depression  Endocrine: no polyuria, polydipsia      ALL OTHER SYSTEMS REVIEWED AND NEGATIVE    UNABLE TO OBTAIN    PHYSICAL EXAM:  VITALS: T(C): 36.9 (10-09-20 @ 13:14)  T(F): 98.4 (10-09-20 @ 13:14), Max: 98.4 (10-08-20 @ 21:41)  HR: 87 (10-09-20 @ 13:14) (77 - 96)  BP: 137/71 (10-09-20 @ 13:14) (125/65 - 142/72)  RR:  (16 - 18)  SpO2:  (98% - 100%)    GENERAL: NAD, well-groomed, well-developed  EYES: No proptosis, no lid lag, anicteric  HEENT:  Atraumatic, Normocephalic, moist mucous membranes  THYROID: Normal size, no palpable nodules  RESPIRATORY: Clear to auscultation bilaterally; No rales, rhonchi, wheezing, or rubs  CARDIOVASCULAR: Regular rate and rhythm; No murmurs; no peripheral edema  GI: Soft, nontender, non distended, normal bowel sounds  SKIN: Dry, intact, No rashes or lesions  MUSCULOSKELETAL: Full range of motion, normal strength  NEURO: sensation intact, extraocular movements intact, no tremor, normal reflexes  PSYCH: Alert and oriented x 3, normal affect, normal mood      POCT Blood Glucose.: 406 mg/dL (10-09-20 @ 11:10)  POCT Blood Glucose.: 195 mg/dL (10-09-20 @ 07:24)  POCT Blood Glucose.: 265 mg/dL (10-08-20 @ 21:38)  POCT Blood Glucose.: 256 mg/dL (10-08-20 @ 16:40)  POCT Blood Glucose.: 351 mg/dL (10-08-20 @ 11:16)  POCT Blood Glucose.: 198 mg/dL (10-08-20 @ 07:26)  POCT Blood Glucose.: 223 mg/dL (10-07-20 @ 22:18)  POCT Blood Glucose.: 230 mg/dL (10-07-20 @ 17:14)  POCT Blood Glucose.: 263 mg/dL (10-07-20 @ 11:42)  POCT Blood Glucose.: 160 mg/dL (10-07-20 @ 08:54)      10-09    137  |  101  |  15  ----------------------------<  191<H>  4.2   |  23  |  0.65    EGFR if : 118  EGFR if non : 102    Ca    9.9      10-09  Mg     2.0     10-09  Phos  3.5     10-09    TPro  8.3  /  Alb  3.6  /  TBili  0.6  /  DBili  x   /  AST  14  /  ALT  16  /  AlkPhos  72  10-07          Thyroid Function Tests:  10-09 @ 05:54 TSH -- FreeT4 4.68 T3 235.5 Anti TPO -- Anti Thyroglobulin Ab -- TSI --  10-08 @ 06:00 TSH -- FreeT4 -- T3 245.7 Anti TPO -- Anti Thyroglobulin Ab -- TSI --          EXAM: US THYROID       PROCEDURE DATE: Oct 7 2020         INTERPRETATION: CLINICAL INFORMATION: Hyperthyroidism. Evaluate for thyroid nodules.     COMPARISON: None available.     TECHNIQUE: Sonography of the thyroid.     FINDINGS:   Right Lobe: 3.1 x 2.0 x 2.1 cm. Diffusely heterogeneous without discrete nodules. Normal vascularity.     Left Lobe: 3.6 x 1.7 x 2.3 cm. Diffusely heterogeneous without discrete nodules. Normal vascularity.     Isthmus: 2 mm.     Cervical Lymph Nodes: No enlarged or abnormal morphology cervical nodes.     IMPRESSION:     Diffusely heterogeneous thyroid gland without nodules.

## 2020-10-09 NOTE — PROGRESS NOTE ADULT - PROBLEM SELECTOR PROBLEM 2
Type 2 diabetes mellitus with hyperglycemia, without long-term current use of insulin
Adrenal nodule

## 2020-10-09 NOTE — PROGRESS NOTE ADULT - PROBLEM SELECTOR PROBLEM 3
Adrenal nodule
Type 2 diabetes mellitus with hyperglycemia, without long-term current use of insulin

## 2020-10-10 ENCOUNTER — TRANSCRIPTION ENCOUNTER (OUTPATIENT)
Age: 65
End: 2020-10-10

## 2020-10-10 VITALS
HEART RATE: 79 BPM | TEMPERATURE: 99 F | SYSTOLIC BLOOD PRESSURE: 133 MMHG | OXYGEN SATURATION: 100 % | RESPIRATION RATE: 18 BRPM | DIASTOLIC BLOOD PRESSURE: 69 MMHG

## 2020-10-10 LAB
ALBUMIN SERPL ELPH-MCNC: 3.8 G/DL — SIGNIFICANT CHANGE UP (ref 3.3–5)
ALP SERPL-CCNC: 74 U/L — SIGNIFICANT CHANGE UP (ref 40–120)
ALT FLD-CCNC: 17 U/L — SIGNIFICANT CHANGE UP (ref 4–41)
ANION GAP SERPL CALC-SCNC: 13 MMO/L — SIGNIFICANT CHANGE UP (ref 7–14)
AST SERPL-CCNC: 16 U/L — SIGNIFICANT CHANGE UP (ref 4–40)
BASOPHILS # BLD AUTO: 0.05 K/UL — SIGNIFICANT CHANGE UP (ref 0–0.2)
BASOPHILS NFR BLD AUTO: 0.4 % — SIGNIFICANT CHANGE UP (ref 0–2)
BILIRUB SERPL-MCNC: 0.4 MG/DL — SIGNIFICANT CHANGE UP (ref 0.2–1.2)
BUN SERPL-MCNC: 17 MG/DL — SIGNIFICANT CHANGE UP (ref 7–23)
CALCIUM SERPL-MCNC: 10 MG/DL — SIGNIFICANT CHANGE UP (ref 8.4–10.5)
CHLORIDE SERPL-SCNC: 102 MMOL/L — SIGNIFICANT CHANGE UP (ref 98–107)
CO2 SERPL-SCNC: 23 MMOL/L — SIGNIFICANT CHANGE UP (ref 22–31)
CREAT SERPL-MCNC: 0.71 MG/DL — SIGNIFICANT CHANGE UP (ref 0.5–1.3)
EOSINOPHIL # BLD AUTO: 0.19 K/UL — SIGNIFICANT CHANGE UP (ref 0–0.5)
EOSINOPHIL NFR BLD AUTO: 1.5 % — SIGNIFICANT CHANGE UP (ref 0–6)
GLUCOSE BLDC GLUCOMTR-MCNC: 164 MG/DL — HIGH (ref 70–99)
GLUCOSE BLDC GLUCOMTR-MCNC: 311 MG/DL — HIGH (ref 70–99)
GLUCOSE SERPL-MCNC: 162 MG/DL — HIGH (ref 70–99)
HCT VFR BLD CALC: 37.2 % — LOW (ref 39–50)
HGB BLD-MCNC: 11.8 G/DL — LOW (ref 13–17)
IMM GRANULOCYTES NFR BLD AUTO: 0.3 % — SIGNIFICANT CHANGE UP (ref 0–1.5)
LYMPHOCYTES # BLD AUTO: 2.94 K/UL — SIGNIFICANT CHANGE UP (ref 1–3.3)
LYMPHOCYTES # BLD AUTO: 23.1 % — SIGNIFICANT CHANGE UP (ref 13–44)
MAGNESIUM SERPL-MCNC: 2.2 MG/DL — SIGNIFICANT CHANGE UP (ref 1.6–2.6)
MCHC RBC-ENTMCNC: 30.3 PG — SIGNIFICANT CHANGE UP (ref 27–34)
MCHC RBC-ENTMCNC: 31.7 % — LOW (ref 32–36)
MCV RBC AUTO: 95.6 FL — SIGNIFICANT CHANGE UP (ref 80–100)
MONOCYTES # BLD AUTO: 1.07 K/UL — HIGH (ref 0–0.9)
MONOCYTES NFR BLD AUTO: 8.4 % — SIGNIFICANT CHANGE UP (ref 2–14)
NEUTROPHILS # BLD AUTO: 8.42 K/UL — HIGH (ref 1.8–7.4)
NEUTROPHILS NFR BLD AUTO: 66.3 % — SIGNIFICANT CHANGE UP (ref 43–77)
NRBC # FLD: 0 K/UL — SIGNIFICANT CHANGE UP (ref 0–0)
PHOSPHATE SERPL-MCNC: 4 MG/DL — SIGNIFICANT CHANGE UP (ref 2.5–4.5)
PLATELET # BLD AUTO: 422 K/UL — HIGH (ref 150–400)
PMV BLD: 10.5 FL — SIGNIFICANT CHANGE UP (ref 7–13)
POTASSIUM SERPL-MCNC: 5 MMOL/L — SIGNIFICANT CHANGE UP (ref 3.5–5.3)
POTASSIUM SERPL-SCNC: 5 MMOL/L — SIGNIFICANT CHANGE UP (ref 3.5–5.3)
PROT SERPL-MCNC: 8.2 G/DL — SIGNIFICANT CHANGE UP (ref 6–8.3)
RBC # BLD: 3.89 M/UL — LOW (ref 4.2–5.8)
RBC # FLD: 11.1 % — SIGNIFICANT CHANGE UP (ref 10.3–14.5)
SODIUM SERPL-SCNC: 138 MMOL/L — SIGNIFICANT CHANGE UP (ref 135–145)
T3 SERPL-MCNC: 228.8 NG/DL — HIGH (ref 80–200)
T4 FREE SERPL-MCNC: 4.39 NG/DL — HIGH (ref 0.9–1.8)
WBC # BLD: 12.71 K/UL — HIGH (ref 3.8–10.5)
WBC # FLD AUTO: 12.71 K/UL — HIGH (ref 3.8–10.5)

## 2020-10-10 RX ORDER — ATORVASTATIN CALCIUM 80 MG/1
1 TABLET, FILM COATED ORAL
Qty: 0 | Refills: 0 | DISCHARGE

## 2020-10-10 RX ORDER — METHIMAZOLE 10 MG/1
1 TABLET ORAL
Qty: 90 | Refills: 0
Start: 2020-10-10 | End: 2020-11-08

## 2020-10-10 RX ORDER — METOPROLOL TARTRATE 50 MG
1 TABLET ORAL
Qty: 60 | Refills: 0
Start: 2020-10-10 | End: 2020-11-08

## 2020-10-10 RX ORDER — REPAGLINIDE 1 MG/1
1 TABLET ORAL
Qty: 90 | Refills: 0
Start: 2020-10-10 | End: 2020-11-08

## 2020-10-10 RX ADMIN — Medication 6 UNIT(S): at 07:33

## 2020-10-10 RX ADMIN — Medication 25 MILLIGRAM(S): at 05:16

## 2020-10-10 RX ADMIN — Medication 650 MILLIGRAM(S): at 15:25

## 2020-10-10 RX ADMIN — Medication 6 UNIT(S): at 11:30

## 2020-10-10 RX ADMIN — Medication 1: at 07:33

## 2020-10-10 RX ADMIN — Medication 4: at 11:29

## 2020-10-10 RX ADMIN — LOSARTAN POTASSIUM 50 MILLIGRAM(S): 100 TABLET, FILM COATED ORAL at 05:16

## 2020-10-10 NOTE — CHART NOTE - NSCHARTNOTEFT_GEN_A_CORE
patient was seen at 3 PM discussed in detail his MRI result and about his adrenal adenoma needs to be followed by nephrologist also he will be seen by me in 2 weeks for a CBC due to his methimazole   1 mg 3 times a day.  discussed with medical team and nursing

## 2020-10-10 NOTE — DISCHARGE NOTE PROVIDER - HOSPITAL COURSE
66 y/o M with PMH of HTN and DM2 presented to the hospital with an unintentional weight loss found to have hyperthyroidism after receiving IV contrast and  found to have indeterminate L adrenal nodule.     1. Hyperthyroidism  -Endocrine consulted - Thyroiditis vs. Graves disease exacerbated by administration of IV contrast  -Thyroid ultrasound not showing any discrete nodules, not a hypervascular gland   -Avoid further IV contrast  -Patient was started on metoprolol 25 mg BID, methimazole 30mg daily (or can give 10mg TID per Endo)  -Pending results of TSH rab and TSI, TPO Ab <10  -Free T4 is trending down   -Will need to follow up with Endocrine outpatient, likely is thyroiditis but accurate diagnosis after availability of ab testing      2. Unintentional weight loss  - Likely in setting of his hyperthyroidism  - Patient states that his PCP was worried about malignancy and had therefore sent him in for further eval, CT C/A/P here with 1.4cm adrenal nodule but likely incidentaloma, unlikely to be a cause of patient's symptoms  - MRI abdomen done for further evaluation of L adrenal nodule: Left adrenal nodules measuring up to 1.2 and 1.0 cm demonstrate signal dropout on chemical shift imaging typical for adrenal adenoma.  - Outpatient Endocrine f/u for L adrenal adenoma  - Of note, patient reports being out of date with his colonoscopy, was trying to get it scheduled recently - Would likely need outpatient follow up for colonoscopy once his hyperthyroidism is better controlled.    3. Adrenal nodule  - Plasma Renin/Aldosterone as well as plasma metanephrine testing  - Dex suppression test as outpt  - Outpatient Endocrine follow up for further workup    4. Type 2 diabetes mellitus with hyperglycemia, without long-term current use of insulin    -Endocrine following; started on Lantus and Humalog while inpatient. Per Endocrine, on discharge can continue Janumet and will add Repaglinide 1mg TID with meals.  -CHO diet and FS AC and HS  -HgA1C of 7.7%  -Outpatient Endocrine follow up  -Patient will need follow up appt at Endocrine Office [address] [place] [address] [phone] 64 y/o M with PMH of HTN and DM2 presented to the hospital with an unintentional weight loss found to have hyperthyroidism after receiving IV contrast and  found to have indeterminate L adrenal nodule.     1. Hyperthyroidism  -Endocrine consulted - Thyroiditis vs. Graves disease exacerbated by administration of IV contrast  -Thyroid ultrasound not showing any discrete nodules, not a hypervascular gland   -Avoid further IV contrast  -Patient was started on metoprolol 25 mg BID, methimazole 30mg daily (or can give 10mg TID per Endo)  -Pending results of TSH rab and TSI, TPO Ab <10  -Free T4 is trending down   -Will need to follow up with Endocrine outpatient, likely is thyroiditis but accurate diagnosis after availability of ab testing    2. Unintentional weight loss  - Likely in setting of his hyperthyroidism  - Patient states that his PCP was worried about malignancy and had therefore sent him in for further eval, CT C/A/P here with 1.4cm adrenal nodule but likely incidentaloma, unlikely to be a cause of patient's symptoms  - MRI abdomen done for further evaluation of L adrenal nodule: Left adrenal nodules measuring up to 1.2 and 1.0 cm demonstrate signal dropout on chemical shift imaging typical for adrenal adenoma.  - Outpatient Endocrine f/u for L adrenal adenoma  - Of note, patient reports being out of date with his colonoscopy, was trying to get it scheduled recently - Would likely need outpatient follow up for colonoscopy once his hyperthyroidism is better controlled.    3. Adrenal nodule  - Plasma Renin/Aldosterone as well as plasma metanephrine testing  - Dex suppression test as outpt  - Outpatient Endocrine follow up for further workup    4. Type 2 diabetes mellitus with hyperglycemia, without long-term current use of insulin    -Endocrine following; started on Lantus and Humalog while inpatient. Per Endocrine, on discharge can continue Janumet and will add Repaglinide 1mg TID with meals.  -CHO diet and FS AC and HS  -HgA1C of 7.7%  -Outpatient Endocrine follow up  -Patient will need follow up appt at Endocrine Office [address] [place] [address] [phone]    Case discussed with Dr. Bob and Endocrine team on 10/10/20 and patient is medically stable for discharge home. 66 y/o M with PMH of HTN and DM2 presented to the hospital with an unintentional weight loss found to have hyperthyroidism after receiving IV contrast and  found to have indeterminate L adrenal nodule.     1. Hyperthyroidism  -Endocrine consulted - Thyroiditis vs. Graves disease exacerbated by administration of IV contrast  -Thyroid ultrasound not showing any discrete nodules, not a hypervascular gland   -Avoid further IV contrast  -Patient was started on metoprolol 25 mg BID, methimazole 30mg daily (or can give 10mg TID per Endo)  -Pending results of TSH rab and TSI, TPO Ab <10  -Free T4 is trending down   -Will need to follow up with Endocrine outpatient, likely is thyroiditis but accurate diagnosis after availability of ab testing    2. Unintentional weight loss  - Likely in setting of his hyperthyroidism  - Patient states that his PCP was worried about malignancy and had therefore sent him in for further eval, CT C/A/P here with 1.4cm adrenal nodule but likely incidentaloma, unlikely to be a cause of patient's symptoms  - MRI abdomen done for further evaluation of L adrenal nodule: Left adrenal nodules measuring up to 1.2 and 1.0 cm demonstrate signal dropout on chemical shift imaging typical for adrenal adenoma.  - Outpatient Endocrine f/u for L adrenal adenoma  - Of note, patient reports being out of date with his colonoscopy, was trying to get it scheduled recently - Would likely need outpatient follow up for colonoscopy once his hyperthyroidism is better controlled.    3. Adrenal nodule  - Plasma Renin/Aldosterone as well as plasma metanephrine testing  - Dex suppression test as outpt  - Outpatient Endocrine follow up for further workup    4. Type 2 diabetes mellitus with hyperglycemia, without long-term current use of insulin    -Endocrine following; started on Lantus and Humalog while inpatient. Per Endocrine, on discharge can continue Janumet and will add Repaglinide 1mg TID with meals.  -CHO diet and FS AC and HS  -HgA1C of 7.7%  -Outpatient Endocrine follow up  -Patient will need follow up appt at Endocrine Office [address] [place] [address] [phone]    Case discussed with Dr. Bob and Endocrine team on 10/10/20 and patient is medically stable for discharge home. Medications reviewed with patient and new medications sent to pharmacy of patient's choice.

## 2020-10-10 NOTE — DISCHARGE NOTE NURSING/CASE MANAGEMENT/SOCIAL WORK - PATIENT PORTAL LINK FT
You can access the FollowMyHealth Patient Portal offered by [facility] by registering at the following website: [email or web address]. By joining [facility]’s FollowMyHealth portal, you will also be able to view your health information using other applications (apps) compatible with our system.

## 2020-10-10 NOTE — DISCHARGE NOTE PROVIDER - NSFOLLOWUPCLINICS_GEN_ALL_ED_FT
[facility] Endocrinology  Endocrinology  [address] [place] [address]  Phone: [phone]  Fax:   Follow Up Time:

## 2020-10-10 NOTE — DISCHARGE NOTE PROVIDER - CARE PROVIDER_API CALL
Sourav Bob)  Medicine  [address] [place] [address]  Phone: [phone]  Fax: [phone]  Follow Up Time:

## 2020-10-10 NOTE — DISCHARGE NOTE PROVIDER - NSDCCPCAREPLAN_GEN_ALL_CORE_FT
PRINCIPAL DISCHARGE DIAGNOSIS  Diagnosis: Hyperthyroidism  Assessment and Plan of Treatment: You were found to have an overactive thyroid and were seen by the Endocrinology team. Possible Thyroiditis vs. Graves disease worsened by administration of IV contrast. Please avoid further IV contrast. You were started on metoprolol 25mg twice a day and methimazole 30mg daily (can take all at once or can take 10mg three times a day). Please follow up with   2. Unintentional weight loss  - Likely in setting of his hyperthyroidism  - Patient states that his PCP was worried about malignancy and had therefore sent him in for further eval, CT C/A/P here with 1.4cm adrenal nodule but likely incidentaloma, unlikely to be a cause of patient's symptoms  - MRI abdomen done for further evaluation of L adrenal nodule: Left adrenal nodules measuring up to 1.2 and 1.0 cm demonstrate signal dropout on chemical shift imaging typical for adrenal adenoma.  - Outpatient Endocrine f/u for L adrenal adenoma  - Of note, patient reports being out of date with his colonoscopy, was trying to get it scheduled recently - Would likely need outpatient follow up for colonoscopy once his hyperthyroidism is better controlled.  3. Adrenal nodule  - Plasma Renin/Aldosterone as well as plasma metanephrine testing  - Dex suppression test as outpt  - Outpatient Endocrine follow up for further workup       PRINCIPAL DISCHARGE DIAGNOSIS  Diagnosis: Hyperthyroidism  Assessment and Plan of Treatment: You were found to have an overactive thyroid and were seen by the Endocrinology team. Possible Thyroiditis vs. Graves disease worsened by administration of IV contrast. Please avoid further IV contrast. You were started on metoprolol 25mg twice a day and methimazole 30mg daily (can take all at once or can take 10mg three times a day). Please follow up at the [facility] Endocrine Office located at [address] [place] [address].; call [phone] for appointment.      SECONDARY DISCHARGE DIAGNOSES  Diagnosis: Weight loss, unintentional  Assessment and Plan of Treatment: Likely in setting of hyperthyroidism. You had a CT scan of your chest, abdomen, and pelvis which did not show any masses concerning for cancer. You were incidentally found to have a 1.4cm adrenal nodule but unlikely to be a cause of your symptoms. Outpatient follow up with Endocrine for Left adrenal nodule. Outpatient follow up for routine colonoscopy for screening once your hyperthyroidism is better controlled.    Diagnosis: Adrenal nodule  Assessment and Plan of Treatment: You were incidentally found to have a 1.4cm adrenal nodule but unlikely to be a cause of your symptoms. Outpatient dexamethasone suppression test. Outpatient follow up at [facility] Endocrine Office located at [address] [place] [address].; call [phone] for appointment.    Diagnosis: Type 2 diabetes mellitus with hyperglycemia, without long-term current use of insulin  Assessment and Plan of Treatment: Continue Janumet and will add Repaglinide 1mg three times a day with meals. Monitor finger sticks pre-meal and bedtime, low salt, fat and carbohydrate diet, minimize glucose intake.  Exercise daily for at least 30 minutes and weight loss. Follow up with endocrinologist for routine Hemoglobin A1C checks and management. Follow up at Endocrine Office located at [address] [place] [address].; call [phone] for appointment.

## 2020-10-10 NOTE — DISCHARGE NOTE PROVIDER - NSDCMRMEDTOKEN_GEN_ALL_CORE_FT
atorvastatin 20 mg oral tablet: 1 tab(s) orally once a day  Janumet 50 mg-1000 mg oral tablet: 1 tab(s) orally 2 times a day  losartan 50 mg oral tablet: 1 tab(s) orally once a day   Janumet 50 mg-1000 mg oral tablet: 1 tab(s) orally 2 times a day  losartan 50 mg oral tablet: 1 tab(s) orally once a day  methIMAzole 10 mg oral tablet: 1 tab(s) orally every 8 hours  metoprolol tartrate 25 mg oral tablet: 1 tab(s) orally 2 times a day  repaglinide 1 mg oral tablet: 1 tab(s) orally 3 times a day (before meals)

## 2020-10-12 PROBLEM — I10 ESSENTIAL (PRIMARY) HYPERTENSION: Chronic | Status: ACTIVE | Noted: 2020-10-07

## 2020-10-12 PROBLEM — Z00.00 ENCOUNTER FOR PREVENTIVE HEALTH EXAMINATION: Status: ACTIVE | Noted: 2020-10-12

## 2020-10-12 PROBLEM — E11.9 TYPE 2 DIABETES MELLITUS WITHOUT COMPLICATIONS: Chronic | Status: ACTIVE | Noted: 2020-10-07

## 2020-10-12 LAB — ALDOST SERPL-MCNC: 11.1 NG/DL — SIGNIFICANT CHANGE UP

## 2020-10-13 ENCOUNTER — APPOINTMENT (OUTPATIENT)
Dept: ENDOCRINOLOGY | Facility: CLINIC | Age: 65
End: 2020-10-13
Payer: COMMERCIAL

## 2020-10-13 VITALS
DIASTOLIC BLOOD PRESSURE: 66 MMHG | HEIGHT: 66 IN | SYSTOLIC BLOOD PRESSURE: 110 MMHG | OXYGEN SATURATION: 97 % | BODY MASS INDEX: 19.93 KG/M2 | TEMPERATURE: 98 F | HEART RATE: 103 BPM | WEIGHT: 124 LBS

## 2020-10-13 DIAGNOSIS — Z85.850 PERSONAL HISTORY OF MALIGNANT NEOPLASM OF THYROID: ICD-10-CM

## 2020-10-13 DIAGNOSIS — E11.9 TYPE 2 DIABETES MELLITUS W/OUT COMPLICATIONS: ICD-10-CM

## 2020-10-13 PROCEDURE — 99215 OFFICE O/P EST HI 40 MIN: CPT

## 2020-10-13 RX ORDER — METHIMAZOLE 10 MG/1
10 TABLET ORAL DAILY
Qty: 30 | Refills: 5 | Status: ACTIVE | COMMUNITY
Start: 2020-10-13 | End: 1900-01-01

## 2020-10-13 RX ORDER — SITAGLIPTIN AND METFORMIN HYDROCHLORIDE 50; 1000 MG/1; MG/1
50-1000 TABLET, FILM COATED ORAL TWICE DAILY
Qty: 180 | Refills: 1 | Status: ACTIVE | COMMUNITY
Start: 2020-10-13 | End: 1900-01-01

## 2020-10-16 PROBLEM — E11.9 DIABETES TYPE 2, CONTROLLED: Status: RESOLVED | Noted: 2020-10-16 | Resolved: 2020-10-16

## 2020-10-16 PROBLEM — Z85.850 HISTORY OF MALIGNANT NEOPLASM OF THYROID: Status: RESOLVED | Noted: 2020-10-13 | Resolved: 2020-10-16

## 2020-10-16 RX ORDER — REPAGLINIDE 2 MG/1
2 TABLET ORAL
Refills: 0 | Status: ACTIVE | COMMUNITY

## 2020-10-16 RX ORDER — LOSARTAN POTASSIUM 25 MG/1
25 TABLET, FILM COATED ORAL
Refills: 0 | Status: ACTIVE | COMMUNITY

## 2020-10-16 NOTE — REVIEW OF SYSTEMS
[Fatigue] : no fatigue [Decreased Appetite] : appetite not decreased [Recent Weight Gain (___ Lbs)] : no recent weight gain [Recent Weight Loss (___ Lbs)] : no recent weight loss [Visual Field Defect] : no visual field defect [Dry Eyes] : no dryness [Dysphagia] : no dysphagia [Dysphonia] : no dysphonia

## 2020-10-16 NOTE — HISTORY OF PRESENT ILLNESS
[FreeTextEntry1] : 65 year old M with history of HTN and DMT2 who presents from  hospital with complaints of ~30 pounds weight loss over the past month. Said that he has noted a significant decrease in his appetite (currently eats minimal meals twice daily) and as a result has lost ~30 pounds over the past month. Has felt weak as a result but otherwise denied any other significant complaints. \par \par Endocrine consulted for hyperthyroidism with TSH <0.1 TT3 266/275 FT4 5.73. Patient denies palpitations, diarrhea or heat\par intolerance but has been experiencing tremors. No FH of thyroid disease. No Hx of thyroid nodules. No neck pain or flu like symptoms. No cardiac history. Has never been on lithium or amiodarone. Received IV contrast for CT scan done for\par malignancy workup and was found to have a 1.4 cm indeterminate L adrenal nodule. No diaphoresis, headaches or flushing No proximal muscle weakness or easy bruising or bleeding. No uncontrolled hypertension, hyponatremia or hyperkalemia.\par \par For DM2, no known complications. Takes Janumet 50-1000mg BID. Does not check FS\par at home. Follows a diet with high intake of carbs.\par \par After hospital stay he is currently on : \par methimazole 30 mg \par metoprolol 25 mg bid \par janumet  mg bid \par repaglinide 1 mg tid \par losartan 50 mg daily \par \par \par \par EXAM: US THYROID \par \par PROCEDURE DATE: Oct 7 2020 \par INTERPRETATION: CLINICAL INFORMATION: Hyperthyroidism. Evaluate for thyroid nodules. \par COMPARISON: None available. \par TECHNIQUE: Sonography of the thyroid. \par FINDINGS: \par Right Lobe: 3.1 x 2.0 x 2.1 cm. Diffusely heterogeneous without discrete nodules. Normal vascularity. \par Left Lobe: 3.6 x 1.7 x 2.3 cm. Diffusely heterogeneous without discrete nodules. Normal vascularity. \par Isthmus: 2 mm. \par Cervical Lymph Nodes: No enlarged or abnormal morphology cervical nodes. \par IMPRESSION: \par Diffusely heterogeneous thyroid gland without nodules. \par \par \par \par MRI ABDOMEN \par ------------------\par ADRENALS: 1.2 x 1.2 cm left adrenal nodule demonstrates signal dropout between in and out of phase images, typical for adrenal adenoma. A smaller adrenal adenoma in the lateral limb of the left adrenal gland measures 1.0 x 0.4 cm.\par \par \par

## 2020-10-16 NOTE — PHYSICAL EXAM
[Alert] : alert [Well Nourished] : well nourished [No Acute Distress] : no acute distress [Normal Sclera/Conjunctiva] : normal sclera/conjunctiva [No Proptosis] : no proptosis [PERRL] : pupils equal, round and reactive to light [Normal Outer Ear/Nose] : the ears and nose were normal in appearance [Normal Hearing] : hearing was normal [Normal TMs] : both tympanic membranes were normal [No Neck Mass] : no neck mass was observed [Thyroid Not Enlarged] : the thyroid was not enlarged [No Thyroid Nodules] : no palpable thyroid nodules [No Respiratory Distress] : no respiratory distress [Clear to Auscultation] : lungs were clear to auscultation bilaterally [Normal S1, S2] : normal S1 and S2 [Normal Rate] : heart rate was normal [Regular Rhythm] : with a regular rhythm [Normal Bowel Sounds] : normal bowel sounds [Not Tender] : non-tender [Soft] : abdomen soft [No Clubbing, Cyanosis] : no clubbing  or cyanosis of the fingernails [Normal Gait] : normal gait [No Joint Swelling] : no joint swelling seen [Normal Strength/Tone] : muscle strength and tone were normal [No Rash] : no rash [No Skin Lesions] : no skin lesions [No Motor Deficits] : the motor exam was normal [Normal Reflexes] : deep tendon reflexes were 2+ and symmetric [No Tremors] : no tremors [Oriented x3] : oriented to person, place, and time [Normal Insight/Judgement] : insight and judgment were intact [Normal Affect] : the affect was normal [Normal Mood] : the mood was normal

## 2020-10-16 NOTE — ASSESSMENT
[FreeTextEntry1] : 65 yr old M with Hx of DM2 A1C 7.7%, no previous history of thyroid disorder\par here with weight loss found to have hyperthyroidism (not in thyroid storm)\par after receiving IV contrast and found to have indeterminate L adrenal nodule.\par DD for hyperthyroidism includes Graves DIease vs Toxic nodules (less likely\par thyroiditis) exacerbated by administration of IV contrast\par \par \par Hyperthyroidism.\par ( Thyroiditis vs. Graves disease)\par -Thyroid ultrasound not showing any discrete nodules, not a hypervascular gland\par -Continue metoprolol 25 mg BID\par -Continue methimazole 30mg daily\par -Will need to check CBC with diff and LFTs\par -Pending results of TSH rab and TSI\par \par \par Adrenal nodule\par -Check Plasma Renin/Aldosterone as well as plasma metanephrine\par -Check salivary cortisol midnight levels x 2 \par -1.2 x 1.2 cm left adrenal nodule demonstrates signal dropout between in and\par out of phase images, typical for adrenal adenoma. A smaller adrenal adenoma in\par the lateral limb of the left adrenal gland measures 1.0 x 0.4 cm.\par \par \par Type 2 diabetes mellitus with hyperglycemia, without long-term current use of\par insulin\par -Continue Janumet  mg BID \par -Started on repaglinide 1 mg TID inpatient \par -HgA1C of 7.7%\par

## 2020-10-17 LAB — RENIN PLAS-CCNC: 3.14 NG/ML/HR — SIGNIFICANT CHANGE UP (ref 0.17–5.38)

## 2020-10-20 LAB — METANEPH SERPL-MCNC: SIGNIFICANT CHANGE UP

## 2020-10-23 LAB
ALBUMIN SERPL ELPH-MCNC: 4.2 G/DL
ALDOSTERONE SERUM: 16.9 NG/DL
ALP BLD-CCNC: 89 U/L
ALT SERPL-CCNC: 20 U/L
ANION GAP SERPL CALC-SCNC: 16 MMOL/L
AST SERPL-CCNC: 17 U/L
BASOPHILS # BLD AUTO: 0.06 K/UL
BASOPHILS NFR BLD AUTO: 0.4 %
BILIRUB SERPL-MCNC: 0.4 MG/DL
BUN SERPL-MCNC: 19 MG/DL
CALCIUM SERPL-MCNC: 10 MG/DL
CHLORIDE SERPL-SCNC: 100 MMOL/L
CO2 SERPL-SCNC: 23 MMOL/L
CORTIS SAL-MCNC: NORMAL
CORTIS SAL-MCNC: NORMAL
CREAT SERPL-MCNC: 0.74 MG/DL
EOSINOPHIL # BLD AUTO: 0.25 K/UL
EOSINOPHIL NFR BLD AUTO: 1.5 %
GLUCOSE SERPL-MCNC: 75 MG/DL
HCT VFR BLD CALC: 38.8 %
HGB BLD-MCNC: 12.3 G/DL
IMM GRANULOCYTES NFR BLD AUTO: 0.4 %
LYMPHOCYTES # BLD AUTO: 2.63 K/UL
LYMPHOCYTES NFR BLD AUTO: 16.3 %
MAN DIFF?: NORMAL
MCHC RBC-ENTMCNC: 31.1 PG
MCHC RBC-ENTMCNC: 31.7 GM/DL
MCV RBC AUTO: 98.2 FL
METANEPHRINE, PL: 101.7 PG/ML
MONOCYTES # BLD AUTO: 1.29 K/UL
MONOCYTES NFR BLD AUTO: 8 %
NEUTROPHILS # BLD AUTO: 11.88 K/UL
NEUTROPHILS NFR BLD AUTO: 73.4 %
NORMETANEPHRINE, PL: 179.6 PG/ML
PLATELET # BLD AUTO: 489 K/UL
POTASSIUM SERPL-SCNC: 5.2 MMOL/L
PROT SERPL-MCNC: 8.3 G/DL
RBC # BLD: 3.95 M/UL
RBC # FLD: 11.5 %
SODIUM SERPL-SCNC: 139 MMOL/L
T3 SERPL-MCNC: 211 NG/DL
T4 FREE SERPL-MCNC: 3.3 NG/DL
TSH SERPL-ACNC: <0.01 UIU/ML
TSI ACT/NOR SER: <0.1 IU/L
WBC # FLD AUTO: 16.17 K/UL

## 2021-01-13 ENCOUNTER — APPOINTMENT (OUTPATIENT)
Dept: ENDOCRINOLOGY | Facility: CLINIC | Age: 66
End: 2021-01-13
Payer: COMMERCIAL

## 2021-01-13 VITALS
HEIGHT: 66 IN | DIASTOLIC BLOOD PRESSURE: 80 MMHG | WEIGHT: 135 LBS | BODY MASS INDEX: 21.69 KG/M2 | OXYGEN SATURATION: 99 % | HEART RATE: 63 BPM | SYSTOLIC BLOOD PRESSURE: 120 MMHG | TEMPERATURE: 96.7 F

## 2021-01-13 PROCEDURE — 99072 ADDL SUPL MATRL&STAF TM PHE: CPT

## 2021-01-13 PROCEDURE — 99214 OFFICE O/P EST MOD 30 MIN: CPT

## 2021-01-13 RX ORDER — SITAGLIPTIN AND METFORMIN HYDROCHLORIDE 50; 1000 MG/1; MG/1
50-1000 TABLET, FILM COATED ORAL TWICE DAILY
Qty: 180 | Refills: 0 | Status: ACTIVE | COMMUNITY
Start: 1900-01-01 | End: 1900-01-01

## 2021-01-13 RX ORDER — METOPROLOL SUCCINATE 25 MG/1
25 TABLET, EXTENDED RELEASE ORAL
Qty: 90 | Refills: 1 | Status: ACTIVE | COMMUNITY
Start: 1900-01-01 | End: 1900-01-01

## 2021-01-13 NOTE — ASSESSMENT
[FreeTextEntry1] : 65 yr old M with Hx of DM2 A1C 7.7%, no previous history of thyroid disorder\par here with weight loss found to have hyperthyroidism (not in thyroid storm)\par after receiving IV contrast and found to have indeterminate L adrenal nodule.\par \par \par \par Hyperthyroidism.\par -Clnically euthyroid today \par -Thyroid ultrasound not showing any discrete nodules, not a hypervascular gland\par -Continue metoprolol 25 mg BID\par -Continue methimazole 35mg daily\par -Will need to check CBC with diff and LFTs\par -TSI negative likely previous thyroiditis \par \par \par Adrenal nodule\par -Mild elevation in previous metanephrine levels, will recheck today \par -1.2 x 1.2 cm left adrenal nodule demonstrates signal dropout between in and\par out of phase images, typical for adrenal adenoma. A smaller adrenal adenoma in\par the lateral limb of the left adrenal gland measures 1.0 x 0.4 cm.\par \par \par Type 2 diabetes mellitus with hyperglycemia, without long-term current use of\par insulin\par -Continue Janumet  mg BID \par -Started on repaglinide 1 mg TID inpatient ( Ran out recently) \par -HgA1C of 7.7%\par \par -Follow up in 8 weeks

## 2021-01-13 NOTE — REVIEW OF SYSTEMS
[Recent Weight Gain (___ Lbs)] : recent weight gain: [unfilled] lbs [Fatigue] : no fatigue [Decreased Appetite] : appetite not decreased [Recent Weight Loss (___ Lbs)] : no recent weight loss [Visual Field Defect] : no visual field defect [Dry Eyes] : no dryness [Dysphagia] : no dysphagia [Neck Pain] : no neck pain [Dysphonia] : no dysphonia [Chest Pain] : no chest pain [Slow Heart Rate] : heart rate is not slow [Palpitations] : no palpitations [Fast Heart Rate] : heart rate is not fast [Shortness Of Breath] : no shortness of breath [Cough] : no cough [Nausea] : no nausea [Constipation] : no constipation [Vomiting] : no vomiting [Diarrhea] : no diarrhea [Polyuria] : no polyuria [Hesistancy] : no hesitancy [Joint Pain] : no joint pain [Muscle Weakness] : no muscle weakness [Headaches] : no headaches [Dizziness] : no dizziness [Tremors] : no tremors [Pain/Numbness of Digits] : no pain/numbness of digits [Depression] : no depression [Polydipsia] : no polydipsia [Cold Intolerance] : no cold intolerance [Easy Bleeding] : no ~M tendency for easy bleeding [Easy Bruising] : no tendency for easy bruising

## 2021-01-13 NOTE — HISTORY OF PRESENT ILLNESS
[FreeTextEntry1] : 65 year old M with history of HTN and DMT2 who presents from hospital with complaints of ~30 pounds weight loss over the past month. Said that he has noted a significant decrease in his appetite (currently eats minimal meals twice daily) and as a result has lost ~30 pounds over the past month. Has felt weak as a result but otherwise denied any other significant complaints. \par \par Endocrine consulted for hyperthyroidism with TSH <0.1 TT3 266/275 FT4 5.73. Patient denies palpitations, diarrhea or heat\par intolerance but has been experiencing tremors. No FH of thyroid disease. No Hx of thyroid nodules. No neck pain or flu like symptoms. No cardiac history. Has never been on lithium or amiodarone. Received IV contrast for CT scan done for\par malignancy workup and was found to have a 1.4 cm indeterminate L adrenal nodule. No diaphoresis, headaches or flushing No proximal muscle weakness or easy bruising or bleeding. No uncontrolled hypertension, hyponatremia or hyperkalemia.\par \par \par TSI negative \par Previous increased to MMI 35 mg daily \par Denied any overt hyperthyroid symptoms today \par \par \par \par \par For DM2, no known complications. Takes Janumet 50-1000mg BID. Does not check FS\par at home. Follows a diet with high intake of carbs.\par \par After hospital stay he is currently on : \par methimazole 30 mg \par metoprolol 25 mg bid \par janumet  mg bid \par repaglinide 1 mg tid \par losartan 50 mg daily \par \par \par \par EXAM: US THYROID \par \par PROCEDURE DATE: Oct 7 2020 \par INTERPRETATION: CLINICAL INFORMATION: Hyperthyroidism. Evaluate for thyroid nodules. \par COMPARISON: None available. \par TECHNIQUE: Sonography of the thyroid. \par FINDINGS: \par Right Lobe: 3.1 x 2.0 x 2.1 cm. Diffusely heterogeneous without discrete nodules. Normal vascularity. \par Left Lobe: 3.6 x 1.7 x 2.3 cm. Diffusely heterogeneous without discrete nodules. Normal vascularity. \par Isthmus: 2 mm. \par Cervical Lymph Nodes: No enlarged or abnormal morphology cervical nodes. \par IMPRESSION: \par Diffusely heterogeneous thyroid gland without nodules. \par \par \par \par MRI ABDOMEN \par ------------------\par ADRENALS: 1.2 x 1.2 cm left adrenal nodule demonstrates signal dropout between in and out of phase images, typical for adrenal adenoma. A smaller adrenal adenoma in the lateral limb of the left adrenal gland measures 1.0 x 0.4 cm.\par \par

## 2021-01-22 LAB
ALBUMIN SERPL ELPH-MCNC: 4.9 G/DL
ALP BLD-CCNC: 103 U/L
ALT SERPL-CCNC: 47 U/L
ANION GAP SERPL CALC-SCNC: 19 MMOL/L
ANION GAP SERPL CALC-SCNC: 20 MMOL/L
AST SERPL-CCNC: 38 U/L
BILIRUB SERPL-MCNC: 0.5 MG/DL
BUN SERPL-MCNC: 10 MG/DL
BUN SERPL-MCNC: 10 MG/DL
CALCIUM SERPL-MCNC: 10.2 MG/DL
CALCIUM SERPL-MCNC: 10.2 MG/DL
CHLORIDE SERPL-SCNC: 97 MMOL/L
CHLORIDE SERPL-SCNC: 98 MMOL/L
CHOLEST SERPL-MCNC: 207 MG/DL
CO2 SERPL-SCNC: 19 MMOL/L
CO2 SERPL-SCNC: 19 MMOL/L
CREAT SERPL-MCNC: 1.1 MG/DL
CREAT SERPL-MCNC: 1.13 MG/DL
CREAT SPEC-SCNC: 106 MG/DL
ESTIMATED AVERAGE GLUCOSE: 131 MG/DL
GLUCOSE SERPL-MCNC: 109 MG/DL
GLUCOSE SERPL-MCNC: 110 MG/DL
HBA1C MFR BLD HPLC: 6.2 %
HDLC SERPL-MCNC: 46 MG/DL
LDLC SERPL CALC-MCNC: 135 MG/DL
METANEPHRINE, PL: 118.6 PG/ML
MICROALBUMIN 24H UR DL<=1MG/L-MCNC: <1.2 MG/DL
MICROALBUMIN/CREAT 24H UR-RTO: NORMAL MG/G
NONHDLC SERPL-MCNC: 161 MG/DL
NORMETANEPHRINE, PL: 307.1 PG/ML
POTASSIUM SERPL-SCNC: 4.8 MMOL/L
POTASSIUM SERPL-SCNC: 4.8 MMOL/L
PROT SERPL-MCNC: 8.2 G/DL
SODIUM SERPL-SCNC: 136 MMOL/L
SODIUM SERPL-SCNC: 136 MMOL/L
T3 SERPL-MCNC: 23 NG/DL
T4 FREE SERPL-MCNC: 0.1 NG/DL
TRIGL SERPL-MCNC: 131 MG/DL
TSH RECEPTOR AB: <1.1 IU/L
TSH SERPL-ACNC: 112 UIU/ML

## 2021-01-23 ENCOUNTER — RX RENEWAL (OUTPATIENT)
Age: 66
End: 2021-01-23

## 2021-01-23 RX ORDER — METHIMAZOLE 10 MG/1
10 TABLET ORAL
Qty: 105 | Refills: 3 | Status: ACTIVE | COMMUNITY
Start: 2020-11-07 | End: 1900-01-01

## 2021-02-24 LAB
T3 SERPL-MCNC: 123 NG/DL
T4 FREE SERPL-MCNC: 1.1 NG/DL
TSH SERPL-ACNC: 8.24 UIU/ML

## 2021-03-03 ENCOUNTER — RX RENEWAL (OUTPATIENT)
Age: 66
End: 2021-03-03

## 2021-03-03 ENCOUNTER — APPOINTMENT (OUTPATIENT)
Dept: ENDOCRINOLOGY | Facility: CLINIC | Age: 66
End: 2021-03-03
Payer: COMMERCIAL

## 2021-03-03 VITALS
WEIGHT: 130 LBS | TEMPERATURE: 99.5 F | HEART RATE: 71 BPM | SYSTOLIC BLOOD PRESSURE: 122 MMHG | HEIGHT: 66 IN | BODY MASS INDEX: 20.89 KG/M2 | DIASTOLIC BLOOD PRESSURE: 80 MMHG | OXYGEN SATURATION: 98 %

## 2021-03-03 PROCEDURE — 99214 OFFICE O/P EST MOD 30 MIN: CPT

## 2021-03-03 PROCEDURE — 99072 ADDL SUPL MATRL&STAF TM PHE: CPT

## 2021-03-03 RX ORDER — METOPROLOL TARTRATE 25 MG/1
25 TABLET, FILM COATED ORAL
Qty: 60 | Refills: 3 | Status: ACTIVE | COMMUNITY
Start: 2020-10-13 | End: 1900-01-01

## 2021-03-04 NOTE — REVIEW OF SYSTEMS
[Fatigue] : no fatigue [Decreased Appetite] : appetite not decreased [Recent Weight Gain (___ Lbs)] : no recent weight gain [Recent Weight Loss (___ Lbs)] : no recent weight loss [Visual Field Defect] : no visual field defect [Dry Eyes] : no dryness [Dysphagia] : no dysphagia [Neck Pain] : no neck pain [Dysphonia] : no dysphonia [Nasal Congestion] : no nasal congestion [Chest Pain] : no chest pain [Slow Heart Rate] : heart rate is not slow [Palpitations] : no palpitations [Fast Heart Rate] : heart rate is not fast [Shortness Of Breath] : no shortness of breath [Cough] : no cough [Nausea] : no nausea [Constipation] : no constipation [Vomiting] : no vomiting [Diarrhea] : no diarrhea [Polyuria] : no polyuria [Hesistancy] : no hesitancy [Joint Pain] : no joint pain [Muscle Weakness] : no muscle weakness [Acanthosis] : no acanthosis  [Acne] : no acne [Headaches] : no headaches [Dizziness] : no dizziness [Tremors] : no tremors [Pain/Numbness of Digits] : no pain/numbness of digits [Depression] : no depression [Polydipsia] : no polydipsia [Cold Intolerance] : no cold intolerance [Easy Bleeding] : no ~M tendency for easy bleeding [Easy Bruising] : no tendency for easy bruising

## 2021-03-04 NOTE — ASSESSMENT
[FreeTextEntry1] : 65 yr old M with Hx of DM2, A1C 7.7%, no previous history of thyroid disorder\par here with weight loss found to have hyperthyroidism (not in thyroid storm)\par after receiving IV contrast and found to have indeterminate L adrenal nodule.\par \par \par Hyperthyroidism\par -TFTs are starting to normalize, will send over medical clearance letter \par -Clinically euthyroid today \par -Now off of methimazole \par -Thyroid ultrasound not showing any discrete nodules, not a hypervascular gland\par -Continue metoprolol 25 mg BID\par -Will need to check CBC with diff and LFTs\par -TSI negative likely previous thyroiditis \par \par \par Adrenal nodule\par -Mild elevation in previous metanephrine levels, will recheck today \par -1.2 x 1.2 cm left adrenal nodule demonstrates signal dropout between in and\par out of phase images, typical for adrenal adenoma. A smaller adrenal adenoma in\par the lateral limb of the left adrenal gland measures 1.0 x 0.4 cm.\par \par \par Type 2 diabetes mellitus with hyperglycemia, without long-term current use of\par insulin\par -Continue Janumet  mg BID \par -Continue on repaglinide 1 mg TID inpatient ( Ran out recently) \par -Will recheck HgA1C \par \par -Follow up in 3-4 months

## 2021-03-04 NOTE — HISTORY OF PRESENT ILLNESS
[FreeTextEntry1] : 65 year old M with history of HTN and DMT2 who presents from hospital with complaints of ~30 pounds weight loss over the past month. Said that he has noted a significant decrease in his appetite (currently eats minimal meals twice daily) and as a result has lost ~30 pounds over the past month. Has felt weak as a result but otherwise denied any other significant complaints. \par \par Endocrine consulted for hyperthyroidism with TSH <0.1 TT3 266/275 FT4 5.73. Patient denies palpitations, diarrhea or heat\par intolerance but has been experiencing tremors. No FH of thyroid disease. No Hx of thyroid nodules. No neck pain or flu like symptoms. No cardiac history. Has never been on lithium or amiodarone. Received IV contrast for CT scan done for\par malignancy workup and was found to have a 1.4 cm indeterminate L adrenal nodule. No diaphoresis, headaches or flushing No proximal muscle weakness or easy bruising or bleeding. No uncontrolled hypertension, hyponatremia or hyperkalemia.\par \par \par TSI negative \par Previous increased to MMI 35 mg daily \par Denied any overt hyperthyroid symptoms today \par \par \par \par \par For DM2, no known complications. Takes Janumet 50-1000mg BID. Does not check FS\par at home. Follows a diet with high intake of carbs.\par \par After hospital stay he is currently on : \par methimazole 30 mg \par metoprolol 25 mg bid \par janumet  mg bid \par repaglinide 1 mg tid \par losartan 50 mg daily \par \par \par \par EXAM: US THYROID \par \par PROCEDURE DATE: Oct 7 2020 \par INTERPRETATION: CLINICAL INFORMATION: Hyperthyroidism. Evaluate for thyroid nodules. \par COMPARISON: None available. \par TECHNIQUE: Sonography of the thyroid. \par FINDINGS: \par Right Lobe: 3.1 x 2.0 x 2.1 cm. Diffusely heterogeneous without discrete nodules. Normal vascularity. \par Left Lobe: 3.6 x 1.7 x 2.3 cm. Diffusely heterogeneous without discrete nodules. Normal vascularity. \par Isthmus: 2 mm. \par Cervical Lymph Nodes: No enlarged or abnormal morphology cervical nodes. \par IMPRESSION: \par Diffusely heterogeneous thyroid gland without nodules. \par \par No HP or tremors \par No excessive bowel movements \par No heat intolerance \par Lost 5 lbs \par \par MRI ABDOMEN \par ------------------\par ADRENALS: 1.2 x 1.2 cm left adrenal nodule demonstrates signal dropout between in and out of phase images, typical for adrenal adenoma. A smaller adrenal adenoma in the lateral limb of the left adrenal gland measures 1.0 x 0.4 cm.\par \par

## 2021-03-15 LAB
CHOLEST SERPL-MCNC: 123 MG/DL
ESTIMATED AVERAGE GLUCOSE: 128 MG/DL
HBA1C MFR BLD HPLC: 6.1 %
HDLC SERPL-MCNC: 38 MG/DL
LDLC SERPL CALC-MCNC: 72 MG/DL
METANEPHRINE, PL: 60.5 PG/ML
NONHDLC SERPL-MCNC: 85 MG/DL
NORMETANEPHRINE, PL: 94.9 PG/ML
T3 SERPL-MCNC: 105 NG/DL
T4 FREE SERPL-MCNC: 1 NG/DL
TRIGL SERPL-MCNC: 63 MG/DL
TSH SERPL-ACNC: 3.01 UIU/ML

## 2021-03-16 ENCOUNTER — NON-APPOINTMENT (OUTPATIENT)
Age: 66
End: 2021-03-16

## 2021-03-20 LAB — RENIN ACTIVITY, PLASMA: 11.36 NG/ML/HR

## 2021-05-07 ENCOUNTER — APPOINTMENT (OUTPATIENT)
Dept: ENDOCRINOLOGY | Facility: CLINIC | Age: 66
End: 2021-05-07
Payer: COMMERCIAL

## 2021-05-07 VITALS
HEART RATE: 73 BPM | BODY MASS INDEX: 23.78 KG/M2 | HEIGHT: 66 IN | WEIGHT: 148 LBS | SYSTOLIC BLOOD PRESSURE: 140 MMHG | OXYGEN SATURATION: 99 % | TEMPERATURE: 97.4 F | DIASTOLIC BLOOD PRESSURE: 80 MMHG

## 2021-05-07 PROCEDURE — 99214 OFFICE O/P EST MOD 30 MIN: CPT

## 2021-05-07 PROCEDURE — 99072 ADDL SUPL MATRL&STAF TM PHE: CPT

## 2021-05-07 RX ORDER — METHIMAZOLE 10 MG/1
10 TABLET ORAL
Refills: 0 | Status: DISCONTINUED | COMMUNITY
End: 2021-05-07

## 2021-05-07 NOTE — ASSESSMENT
[FreeTextEntry1] : 65 yr old M with Hx of DM2, A1C 7.7%, no previous history of thyroid disorder here with weight loss found to have hyperthyroidism (not in thyroid storm) after receiving IV contrast and found to have indeterminate L adrenal nodule.\par \par \par Hyperthyroidism\par -TFTs have normalized \par -Clinically euthyroid today \par -Now off of methimazole \par -Thyroid ultrasound not showing any discrete nodules, not a hypervascular gland\par -Continue metoprolol 25 mg BID\par \par \par \par Adrenal nodule\par -1.2 x 1.2 cm left adrenal nodule demonstrates signal dropout between in and\par out of phase images, typical for adrenal adenoma. A smaller adrenal adenoma in\par the lateral limb of the left adrenal gland measures 1.0 x 0.4 cm.\par -Adrenal testing was within normal limits \par \par Type 2 diabetes mellitus with hyperglycemia, without long-term current use of\par insulin\par -Continue Janumet  mg BID \par -Last HgA1C of 6.1% \par \par -Follow up in 3-4 months. \par

## 2021-05-07 NOTE — PHYSICAL EXAM
[Alert] : alert [Well Nourished] : well nourished [No Acute Distress] : no acute distress [Normal Sclera/Conjunctiva] : normal sclera/conjunctiva [EOMI] : extra ocular movement intact [PERRL] : pupils equal, round and reactive to light [No Proptosis] : no proptosis [Normal Outer Ear/Nose] : the ears and nose were normal in appearance [Normal Hearing] : hearing was normal [Normal TMs] : both tympanic membranes were normal [No Neck Mass] : no neck mass was observed [Thyroid Not Enlarged] : the thyroid was not enlarged [No Respiratory Distress] : no respiratory distress [Clear to Auscultation] : lungs were clear to auscultation bilaterally [Normal S1, S2] : normal S1 and S2 [Normal Rate] : heart rate was normal [Regular Rhythm] : with a regular rhythm [Normal Bowel Sounds] : normal bowel sounds [Not Tender] : non-tender [Soft] : abdomen soft [No CVA Tenderness] : no ~M costovertebral angle tenderness [Normal Gait] : normal gait [No Clubbing, Cyanosis] : no clubbing  or cyanosis of the fingernails [No Joint Swelling] : no joint swelling seen [Normal Strength/Tone] : muscle strength and tone were normal [No Rash] : no rash [No Skin Lesions] : no skin lesions [No Motor Deficits] : the motor exam was normal [Normal Reflexes] : deep tendon reflexes were 2+ and symmetric [No Tremors] : no tremors [Oriented x3] : oriented to person, place, and time [Normal Affect] : the affect was normal [Normal Insight/Judgement] : insight and judgment were intact [Normal Mood] : the mood was normal

## 2021-05-07 NOTE — HISTORY OF PRESENT ILLNESS
[FreeTextEntry1] : 65 year old M with history of HTN and DMT2 who presents from hospital with complaints of ~30 pounds weight loss over the past month. Said that he has noted a significant decrease in his appetite (currently eats minimal meals twice daily) and as a result has lost ~30 pounds over the past month. Has felt weak as a result but otherwise denied any other significant complaints. \par \par Endocrine consulted for hyperthyroidism with TSH <0.1 TT3 266/275 FT4 5.73. Patient denies palpitations, diarrhea or heat\par intolerance but has been experiencing tremors. No FH of thyroid disease. No Hx of thyroid nodules. No neck pain or flu like symptoms. No cardiac history. Has never been on lithium or amiodarone. Received IV contrast for CT scan done for\par malignancy workup and was found to have a 1.4 cm indeterminate L adrenal nodule. No diaphoresis, headaches or flushing No proximal muscle weakness or easy bruising or bleeding. No uncontrolled hypertension, hyponatremia or hyperkalemia.\par \par \par TSI negative \par Currently off of methimazole therapy. \par Previous TFTs were within normal limits \par Denied any overt hyperthyroid symptoms today \par \par For DM2, no known complications. Takes Janumet 50-1000mg BID. Does not check FS\par at home. Follows a diet with high intake of carbs.\par \par After hospital stay he is currently on : \par methimazole 30 mg \par metoprolol 25 mg bid \par janumet  mg bid \par repaglinide 1 mg TID\par losartan 50 mg daily \par \par \par \par EXAM: US THYROID \par \par PROCEDURE DATE: Oct 7 2020 \par INTERPRETATION: CLINICAL INFORMATION: Hyperthyroidism. Evaluate for thyroid nodules. \par COMPARISON: None available. \par TECHNIQUE: Sonography of the thyroid. \par FINDINGS: \par Right Lobe: 3.1 x 2.0 x 2.1 cm. Diffusely heterogeneous without discrete nodules. Normal vascularity. \par Left Lobe: 3.6 x 1.7 x 2.3 cm. Diffusely heterogeneous without discrete nodules. Normal vascularity. \par Isthmus: 2 mm. \par Cervical Lymph Nodes: No enlarged or abnormal morphology cervical nodes. \par IMPRESSION: \par Diffusely heterogeneous thyroid gland without nodules. \par \par \par MRI ABDOMEN \par ------------------\par ADRENALS: 1.2 x 1.2 cm left adrenal nodule demonstrates signal dropout between in and out of phase images, typical for adrenal adenoma. A smaller adrenal adenoma in the lateral limb of the left adrenal gland measures 1.0 x 0.4 cm.\par \par In the interim, \par -Reported that he had surgery on 04/06 \par -Patient has remained off of methimazole\par -No HP or tremors \par -Weight has been stable\par -No hair loss \par -No abnormal bowel movements \par

## 2021-06-07 LAB
T3 SERPL-MCNC: 102 NG/DL
T4 FREE SERPL-MCNC: 1.1 NG/DL
TSH SERPL-ACNC: 4.46 UIU/ML

## 2021-07-30 ENCOUNTER — APPOINTMENT (OUTPATIENT)
Dept: ENDOCRINOLOGY | Facility: CLINIC | Age: 66
End: 2021-07-30

## 2021-11-10 ENCOUNTER — EMERGENCY (EMERGENCY)
Facility: HOSPITAL | Age: 66
LOS: 1 days | Discharge: HOME | End: 2021-11-10
Attending: EMERGENCY MEDICINE | Admitting: EMERGENCY MEDICINE
Payer: COMMERCIAL

## 2021-11-10 VITALS
TEMPERATURE: 98 F | OXYGEN SATURATION: 100 % | RESPIRATION RATE: 17 BRPM | HEART RATE: 81 BPM | DIASTOLIC BLOOD PRESSURE: 82 MMHG | SYSTOLIC BLOOD PRESSURE: 167 MMHG

## 2021-11-10 PROCEDURE — 99285 EMERGENCY DEPT VISIT HI MDM: CPT

## 2021-11-10 PROCEDURE — 72131 CT LUMBAR SPINE W/O DYE: CPT | Mod: 26,MA

## 2021-11-10 PROCEDURE — 72170 X-RAY EXAM OF PELVIS: CPT | Mod: 26

## 2021-11-10 PROCEDURE — 70450 CT HEAD/BRAIN W/O DYE: CPT | Mod: 26,MA

## 2021-11-10 PROCEDURE — 71045 X-RAY EXAM CHEST 1 VIEW: CPT | Mod: 26

## 2021-11-10 PROCEDURE — 72128 CT CHEST SPINE W/O DYE: CPT | Mod: 26,MA

## 2021-11-10 PROCEDURE — 72125 CT NECK SPINE W/O DYE: CPT | Mod: 26,MA

## 2021-11-10 RX ORDER — IBUPROFEN 200 MG
400 TABLET ORAL ONCE
Refills: 0 | Status: COMPLETED | OUTPATIENT
Start: 2021-11-10 | End: 2021-11-10

## 2021-11-10 RX ORDER — ACETAMINOPHEN 500 MG
975 TABLET ORAL ONCE
Refills: 0 | Status: COMPLETED | OUTPATIENT
Start: 2021-11-10 | End: 2021-11-10

## 2021-11-10 RX ADMIN — Medication 400 MILLIGRAM(S): at 10:50

## 2021-11-10 NOTE — ED ADULT TRIAGE NOTE - CHIEF COMPLAINT QUOTE
Pt brought in by EMS s/p trip and fall down 4 steps at a school. Pt arrives in c-collar. Pt c/o head and upper back pain. Denies loc. Denies AC usage.

## 2021-11-10 NOTE — ED ADULT NURSE NOTE - OBJECTIVE STATEMENT
JUSTIN RN: Received pt to Trauma C via EMS from home s/p fall down approx 4 stairs. Pt reports he was walking down the stairs when he began to feel dizzy and fell striking his head. Pt denies LOC, or anticoagulant use. Pt endorses losing vision for a second which has returned upon arrival to ED. Pt arrives with C collar in place, pt c/o headache, neck and back pain. Pt denies numbness, tingling, or weakness. Pt is A&OX4, skin warm dry unremarkable, +strong regular radial pulses bi laterally. Pt denies chest pain, difficulty breathing, fever, cough or chills. Pt has hx of HTN and DM and is compliant with medications, took medication this AM. Pt changed into gown and placed on cardiac monitor, #18g IV placed to R forearm.

## 2021-11-10 NOTE — ED PROVIDER NOTE - PROGRESS NOTE DETAILS
Pt states feeling better. Discussed findings of CT head and old stroke. Pt aware. Return precautions given. Maurice Askew M.D. PGY-4

## 2021-11-10 NOTE — ED PROVIDER NOTE - NSFOLLOWUPCLINICS_GEN_ALL_ED_FT
[facility]  Neurology  [address] [place] [address] [address] [place] [address]  Phone: [phone]  Fax:

## 2021-11-10 NOTE — ED PROVIDER NOTE - CLINICAL SUMMARY MEDICAL DECISION MAKING FREE TEXT BOX
66M s/p mechanical fall down 4 steps. has some mild spinal tenderness without deformities. In C-collar by EMS. Check CT and XR, reassess after imaging. Low suspicion for acute injuries at this time, likely bruising.

## 2021-11-10 NOTE — ED PROVIDER NOTE - PATIENT PORTAL LINK FT
You can access the FollowMyHealth Patient Portal offered by [facility] by registering at the following website: [email or web address]. By joining [facility]’s FollowMyHealth portal, you will also be able to view your health information using other applications (apps) compatible with our system.

## 2021-11-10 NOTE — ED PROVIDER NOTE - NSFOLLOWUPINSTRUCTIONS_ED_ALL_ED_FT
Please follow up with your primary doctor or a neurologist regarding the CT findings concern for an old stroke/infarct.     1. TAKE ALL MEDICATIONS AS DIRECTED.    2. FOR PAIN OR FEVER YOU CAN TAKE IBUPROFEN (MOTRIN, ADVIL) OR ACETAMINOPHEN (TYLENOL) AS NEEDED, AS DIRECTED ON PACKAGING.  3. FOLLOW UP WITH YOUR PRIMARY DOCTOR WITHIN 5 DAYS AS DIRECTED.  4. IF YOU HAD LABS OR IMAGING DONE, YOU WERE GIVEN COPIES OF ALL LABS AND/OR IMAGING RESULTS FROM YOUR ER VISIT--PLEASE TAKE THEM WITH YOU TO YOUR FOLLOW UP APPOINTMENTS.  5. IF NEEDED, CALL PATIENT ACCESS SERVICES AT [phone] TO FIND A PRIMARY CARE PHYSICIAN.  OR CALL [phone] TO MAKE AN APPOINTMENT WITH THE CLINIC.  6. RETURN TO THE ER FOR ANY WORSENING SYMPTOMS OR CONCERNS.     Back Pain    You were seen in the emergency department (ED) today for back pain. Acute back pain is the second most common reason for a physician visit and affects 80% to 85% of people over their lifetime. Most episodes of back pain are not serious and resolve within weeks with conservative therapy.    There are many causes of back pain. Most of the time, the pain is caused by conditions such as a muscle strain, inflammation or a bulging disc that cannot be identified on an X-ray or CT scan. Diagnostic imaging does not accurately identify the cause of most low back pain and therefore does not help guide therapy or improve the time to recovery.    Back pain is very common in adults. The cause of back pain is rarely dangerous and the pain often gets better over time. The cause of your back pain may not be known and may include strain of muscles or ligaments, degeneration of the spinal disks, or arthritis. Occasionally the pain may radiate down your leg(s). Over-the-counter medicines to reduce pain and inflammation are often the most helpful. Stretching and remaining active frequently helps the healing process.    Your provider today has determined that you are not exhibiting any of these worrisome symptoms or physical exam findings. The American College of Emergency Physicians, American College of Physicians, American Society of Anesthesiologists, and the American College of Radiology have all independently advised that acute imaging studies in patients with musculoskeletal low back pain are usually inappropriate and not necessary.    Your provider today has determined that you do not need an emergent MRI. This does not mean that you may not require an MRI as an outpatient in the future if your pain persists or if you develop additional neurologic symptoms.    It is extremely important for you to follow up with your outpatient provider for further examination and discussion regarding treatment and imaging, if necessary.    If you develop any of the following symptoms, return to the ED immediately for re-evaluation:    •Significant trauma or fall, especially if you are over age 65 or have osteoporosis  •Sudden, acute onset of urinary retention or incontinence  •Fecal incontinence  •Loss of sensation (anesthesia) restricted to the area of the buttocks, perineum and inner surfaces of the thighs  •Weakness in the lower limbs

## 2021-11-10 NOTE — ED PROVIDER NOTE - PHYSICAL EXAMINATION
General: Well developed, well nourished  HEENT: Normocephalic and atraumatic, EOMI, Trachea midline.   Cardiac: Normal S1 and S2 w/ RRR. No MRG.  Pulmonary: CTA bilaterally. No increased WOB.   Abdominal: Soft, NTND  Neurologic: AAOx3, strength 5/5 in all extremities. Some isolated weakness in L shoulder and L knee due to old orthopedic injuries.   Musculoskeletal: Some lower C, upper T spine tenderness, no stepoffs or deformities. No limited ROM or deformities of extremities.   Vascular: Warm and well perfused  Skin: Color appropriate   Psychiatric: Appropriate mood and affect. No apparent risk to self or others.  Felix Askew M.D.

## 2021-11-10 NOTE — ED PROVIDER NOTE - ATTENDING CONTRIBUTION TO CARE
I have personally performed a face to face bedside history and physical examination of this patient. I have discussed the history, examination, review of systems, assessment and plan of management with the resident. I have reviewed the electronic medical record and amended it to reflect my history, review of systems, physical exam, assessment and plan.    Brief HPI:  65 yo M presents in c-collar after a fall.  Patient was walking down steps and sustained mechanical fall.  Denies LOC.  Reports pain of entire spine.  Denies numbness, tingling, weakness, bowel or bladder incontinence or retention.  Denies etoh or illicit drug use.  Denies anticoagulation.     Vitals:   Reviewed    Exam:    GEN:  Non-toxic appearing, non-distressed, speaking full sentences, non-diaphoretic, AAOx3  HEENT:  NCAT, neck supple, EOMI, PERRLA, sclera anicteric, no conjunctival pallor or injection, no stridor, normal voice, no tonsillar exudate, uvula midline  CV:  regular rhythm and rate, s1/s2 audible, no murmurs, rubs or gallops, peripheral pulses 2+ and symmetric  PULM:  non-labored respirations, lungs clear to auscultation bilaterally, no wheezes, crackles or rales  ABD:  non distended, non-tender, no rebound, no guarding, negative Robbins's sign, bowel sounds normal, no cvat  MSK:  no gross deformity, non-tender extremities and joints, range of motion grossly normal appearing, no extremity edema, extremities warm and well perfused   NEURO:  AAOx3, CN II-XII intact, motor 5/5 in upper and lower extremities bilaterally, sensation grossly intact in extremities and trunk, finger to nose testing wnl, no nystagmus, negative Romberg, no pronator drift, no gait deficit  SKIN:  warm, dry, no rash or vesicles   SPINE:  +c/t/l spine tenderness midline without tenderness     A/P:  65 yo M presents in c-collar after a fall with neck and back pain.  VSS.  Exam without focal neurological findings.  Low c/f acute cord compression or cauda equina.  Will obtain ct and x-rays.  Dispo pending.

## 2021-11-10 NOTE — ED ADULT NURSE NOTE - NSIMPLEMENTINTERV_GEN_ALL_ED
family no Implemented All Fall Risk Interventions:  [place] to call system. Call bell, personal items and telephone within reach. Instruct patient to call for assistance. Room bathroom lighting operational. Non-slip footwear when patient is off stretcher. Physically safe environment: no spills, clutter or unnecessary equipment. Stretcher in lowest position, wheels locked, appropriate side rails in place. Provide visual cue, wrist band, yellow gown, etc. Monitor gait and stability. Monitor for mental status changes and reorient to person, place, and time. Review medications for side effects contributing to fall risk. Reinforce activity limits and safety measures with patient and family.

## 2021-11-10 NOTE — ED ADULT NURSE NOTE - NSFALLRSKUNASSIST_ED_ALL_ED
Eat healthy foods you enjoy. Apixaban/Eliquis DOES NOT have a special diet. Limit your alcohol intake. no

## 2021-11-10 NOTE — ED PROVIDER NOTE - OBJECTIVE STATEMENT
66M presents after a fall. Pt states was going to work when he slipped on steps. States stairs were slippery and caused him to lose footing. 66M presents after a fall. Pt states was going to work when he slipped on steps. States stairs were slippery and caused him to lose footing. Denies LOC. C/o pain in lower neck and lower back. Not on blood thinners. No no numbness/weakness, HA, f/c, CP/SOB, n/v/d  PMH HTN, DM

## 2021-11-15 ENCOUNTER — APPOINTMENT (OUTPATIENT)
Dept: ENDOCRINOLOGY | Facility: CLINIC | Age: 66
End: 2021-11-15
Payer: COMMERCIAL

## 2021-11-15 VITALS
WEIGHT: 140 LBS | HEIGHT: 66 IN | HEART RATE: 72 BPM | OXYGEN SATURATION: 99 % | SYSTOLIC BLOOD PRESSURE: 140 MMHG | DIASTOLIC BLOOD PRESSURE: 90 MMHG | BODY MASS INDEX: 22.5 KG/M2 | TEMPERATURE: 98 F

## 2021-11-15 LAB
GLUCOSE BLDC GLUCOMTR-MCNC: 124
HBA1C MFR BLD HPLC: 7.2

## 2021-11-15 PROCEDURE — 82962 GLUCOSE BLOOD TEST: CPT

## 2021-11-15 PROCEDURE — 83036 HEMOGLOBIN GLYCOSYLATED A1C: CPT | Mod: QW

## 2021-11-15 PROCEDURE — 99214 OFFICE O/P EST MOD 30 MIN: CPT | Mod: 25

## 2021-11-15 NOTE — ASSESSMENT
[FreeTextEntry1] : 66 yr old M with Hx of DM2, A1C 7.7%, no previous history of thyroid disorder here with weight loss found to have hyperthyroidism (not in thyroid storm) after receiving IV contrast and found to have indeterminate L adrenal nodule.\par \par \par Hyperthyroidism\par -TFTs have normalized \par -Clinically euthyroid today \par -Now off of methimazole, will assess further need from here \par -Thyroid ultrasound not showing any discrete nodules, not a hypervascular gland\par -Continue metoprolol 25 mg BID\par \par Adrenal nodule\par -1.2 x 1.2 cm left adrenal nodule demonstrates signal dropout between in and\par out of phase images, typical for adrenal adenoma. A smaller adrenal adenoma in\par the lateral limb of the left adrenal gland measures 1.0 x 0.4 cm.\par -Adrenal testing was within normal limits \par \par Type 2 diabetes mellitus with hyperglycemia, without long-term current use of\par insulin\par -Continue Janumet  mg BID \par -Continue Repaglinide 1 mg TID with meals \par -Last HgA1C of 7.2% \par -Advised to cut back on carbs and check SMBGs more often \par \par -Follow up in 3-4 months. \par

## 2021-11-15 NOTE — PHYSICAL EXAM
[Alert] : alert [Well Nourished] : well nourished [No Acute Distress] : no acute distress [Normal Sclera/Conjunctiva] : normal sclera/conjunctiva [PERRL] : pupils equal, round and reactive to light [Normal Outer Ear/Nose] : the ears and nose were normal in appearance [Normal TMs] : both tympanic membranes were normal [No Neck Mass] : no neck mass was observed [No Respiratory Distress] : no respiratory distress [Clear to Auscultation] : lungs were clear to auscultation bilaterally [Normal S1, S2] : normal S1 and S2 [Normal Rate] : heart rate was normal [Regular Rhythm] : with a regular rhythm [Normal Bowel Sounds] : normal bowel sounds [Not Tender] : non-tender [Soft] : abdomen soft [Normal Gait] : normal gait [No Clubbing, Cyanosis] : no clubbing  or cyanosis of the fingernails [No Joint Swelling] : no joint swelling seen [Normal Strength/Tone] : muscle strength and tone were normal [No Rash] : no rash [No Skin Lesions] : no skin lesions [No Motor Deficits] : the motor exam was normal [Normal Reflexes] : deep tendon reflexes were 2+ and symmetric [No Tremors] : no tremors [Oriented x3] : oriented to person, place, and time [Normal Affect] : the affect was normal [Normal Insight/Judgement] : insight and judgment were intact [Normal Mood] : the mood was normal

## 2021-11-27 LAB
ALBUMIN SERPL ELPH-MCNC: 4.5 G/DL
ALP BLD-CCNC: 85 U/L
ALT SERPL-CCNC: 20 U/L
ANION GAP SERPL CALC-SCNC: 14 MMOL/L
AST SERPL-CCNC: 21 U/L
BILIRUB SERPL-MCNC: 0.4 MG/DL
BUN SERPL-MCNC: 13 MG/DL
CALCIUM SERPL-MCNC: 9.8 MG/DL
CHLORIDE SERPL-SCNC: 101 MMOL/L
CHOLEST SERPL-MCNC: 159 MG/DL
CO2 SERPL-SCNC: 22 MMOL/L
CREAT SERPL-MCNC: 0.79 MG/DL
CREAT SPEC-SCNC: 47 MG/DL
GLUCOSE SERPL-MCNC: 131 MG/DL
HDLC SERPL-MCNC: 39 MG/DL
LDLC SERPL CALC-MCNC: 94 MG/DL
MICROALBUMIN 24H UR DL<=1MG/L-MCNC: <1.2 MG/DL
MICROALBUMIN/CREAT 24H UR-RTO: NORMAL MG/G
NONHDLC SERPL-MCNC: 120 MG/DL
POTASSIUM SERPL-SCNC: 4.9 MMOL/L
PROT SERPL-MCNC: 7.3 G/DL
SODIUM SERPL-SCNC: 138 MMOL/L
T3 SERPL-MCNC: 108 NG/DL
T4 FREE SERPL-MCNC: 1.3 NG/DL
TRIGL SERPL-MCNC: 131 MG/DL
TSH SERPL-ACNC: 2.96 UIU/ML

## 2022-03-30 ENCOUNTER — RX RENEWAL (OUTPATIENT)
Age: 67
End: 2022-03-30

## 2022-04-28 ENCOUNTER — APPOINTMENT (OUTPATIENT)
Dept: NEUROLOGY | Facility: HOSPITAL | Age: 67
End: 2022-04-28

## 2022-05-09 ENCOUNTER — APPOINTMENT (OUTPATIENT)
Dept: ENDOCRINOLOGY | Facility: CLINIC | Age: 67
End: 2022-05-09
Payer: COMMERCIAL

## 2022-05-09 VITALS
HEIGHT: 66 IN | SYSTOLIC BLOOD PRESSURE: 138 MMHG | WEIGHT: 140 LBS | TEMPERATURE: 98 F | OXYGEN SATURATION: 97 % | HEART RATE: 82 BPM | BODY MASS INDEX: 22.5 KG/M2 | DIASTOLIC BLOOD PRESSURE: 70 MMHG

## 2022-05-09 DIAGNOSIS — Z86.39 PERSONAL HISTORY OF OTHER ENDOCRINE, NUTRITIONAL AND METABOLIC DISEASE: ICD-10-CM

## 2022-05-09 LAB
GLUCOSE BLDC GLUCOMTR-MCNC: 189
HBA1C MFR BLD HPLC: 6.3

## 2022-05-09 PROCEDURE — 82962 GLUCOSE BLOOD TEST: CPT

## 2022-05-09 PROCEDURE — 83036 HEMOGLOBIN GLYCOSYLATED A1C: CPT | Mod: QW

## 2022-05-09 PROCEDURE — 99214 OFFICE O/P EST MOD 30 MIN: CPT | Mod: 25

## 2022-05-09 NOTE — HISTORY OF PRESENT ILLNESS
[FreeTextEntry1] : 66 year old M with history of HTN and DMT2 who presents from hospital with complaints of ~30 pounds weight loss over the past month. Said that he has noted a significant decrease in his appetite (currently eats minimal meals twice daily) and as a result has lost ~30 pounds over the past month. Has felt weak as a result but otherwise denied any other significant complaints. \par \par Endocrine consulted for hyperthyroidism with TSH <0.1 TT3 266/275 FT4 5.73. Patient denies palpitations, diarrhea or heat\par intolerance but has been experiencing tremors. No FH of thyroid disease. No Hx of thyroid nodules. No neck pain or flu like symptoms. No cardiac history. Has never been on lithium or amiodarone. Received IV contrast for CT scan done for\par malignancy workup and was found to have a 1.4 cm indeterminate L adrenal nodule. No diaphoresis, headaches or flushing No proximal muscle weakness or easy bruising or bleeding. No uncontrolled hypertension, hyponatremia or hyperkalemia.\par \par \par TSI negative \par Currently off of methimazole therapy. \par Previous TFTs were within normal limits \par Denied any overt hyperthyroid symptoms today \par \par For DM2, no known complications. Takes Janumet 50-1000mg BID. Repaglinide 1 mg TID  \par Occasionally checks -160 post lunch \par No hypoglycemia \par at home. He has been watching his carbs \par His physical activity is walking \par Optho: 2-3 months, no retinopathy \par Neuropathy: None \par \par After hospital stay he is currently on : \par metoprolol 25 mg bid \par janumet  mg bid \par repaglinide 1 mg TID\par losartan 50 mg daily \par \par \par \par EXAM: US THYROID \par \par PROCEDURE DATE: Oct 7 2020 \par INTERPRETATION: CLINICAL INFORMATION: Hyperthyroidism. Evaluate for thyroid nodules. \par COMPARISON: None available. \par TECHNIQUE: Sonography of the thyroid. \par FINDINGS: \par Right Lobe: 3.1 x 2.0 x 2.1 cm. Diffusely heterogeneous without discrete nodules. Normal vascularity. \par Left Lobe: 3.6 x 1.7 x 2.3 cm. Diffusely heterogeneous without discrete nodules. Normal vascularity. \par Isthmus: 2 mm. \par Cervical Lymph Nodes: No enlarged or abnormal morphology cervical nodes. \par IMPRESSION: \par Diffusely heterogeneous thyroid gland without nodules. \par \par \par MRI ABDOMEN \par ------------------\par ADRENALS: 1.2 x 1.2 cm left adrenal nodule demonstrates signal dropout between in and out of phase images, typical for adrenal adenoma. A smaller adrenal adenoma in the lateral limb of the left adrenal gland measures 1.0 x 0.4 cm.\par \par In the interim, \par -Reported that he had surgery on 04/06 \par -Patient has remained off of methimazole\par -No HP or tremors \par -Weight has been stable\par -No hair loss \par -No abnormal bowel movements \par \par

## 2022-05-09 NOTE — PHYSICAL EXAM
[Alert] : alert [Well Nourished] : well nourished [No Acute Distress] : no acute distress [Normal Sclera/Conjunctiva] : normal sclera/conjunctiva [EOMI] : extra ocular movement intact [PERRL] : pupils equal, round and reactive to light [Normal Outer Ear/Nose] : the ears and nose were normal in appearance [Normal Hearing] : hearing was normal [Normal TMs] : both tympanic membranes were normal [No Neck Mass] : no neck mass was observed [Thyroid Not Enlarged] : the thyroid was not enlarged [No Respiratory Distress] : no respiratory distress [Clear to Auscultation] : lungs were clear to auscultation bilaterally [Normal Rate] : heart rate was normal [Regular Rhythm] : with a regular rhythm [Normal Bowel Sounds] : normal bowel sounds [Not Tender] : non-tender [Soft] : abdomen soft [Normal Gait] : normal gait [No Joint Swelling] : no joint swelling seen [No Rash] : no rash [No Skin Lesions] : no skin lesions [No Motor Deficits] : the motor exam was normal [Normal Reflexes] : deep tendon reflexes were 2+ and symmetric [No Tremors] : no tremors [Oriented x3] : oriented to person, place, and time [Normal Affect] : the affect was normal [Normal Insight/Judgement] : insight and judgment were intact [Normal Mood] : the mood was normal

## 2022-05-15 LAB
ALBUMIN SERPL ELPH-MCNC: 4.2 G/DL
ALP BLD-CCNC: 134 U/L
ALT SERPL-CCNC: 58 U/L
ANION GAP SERPL CALC-SCNC: 12 MMOL/L
AST SERPL-CCNC: 62 U/L
BILIRUB SERPL-MCNC: 0.4 MG/DL
BUN SERPL-MCNC: 14 MG/DL
CALCIUM SERPL-MCNC: 9.2 MG/DL
CHLORIDE SERPL-SCNC: 102 MMOL/L
CHOLEST SERPL-MCNC: 82 MG/DL
CO2 SERPL-SCNC: 25 MMOL/L
CREAT SERPL-MCNC: 0.88 MG/DL
CREAT SPEC-SCNC: 141 MG/DL
EGFR: 95 ML/MIN/1.73M2
GLUCOSE SERPL-MCNC: 205 MG/DL
HDLC SERPL-MCNC: 33 MG/DL
LDLC SERPL CALC-MCNC: 35 MG/DL
MICROALBUMIN 24H UR DL<=1MG/L-MCNC: <1.2 MG/DL
MICROALBUMIN/CREAT 24H UR-RTO: NORMAL MG/G
NONHDLC SERPL-MCNC: 49 MG/DL
POTASSIUM SERPL-SCNC: 4.4 MMOL/L
PROT SERPL-MCNC: 7.4 G/DL
SODIUM SERPL-SCNC: 138 MMOL/L
T4 FREE SERPL-MCNC: 1.2 NG/DL
TRIGL SERPL-MCNC: 71 MG/DL
TSH SERPL-ACNC: 2.43 UIU/ML

## 2022-05-16 ENCOUNTER — NON-APPOINTMENT (OUTPATIENT)
Age: 67
End: 2022-05-16

## 2022-11-07 ENCOUNTER — APPOINTMENT (OUTPATIENT)
Dept: ENDOCRINOLOGY | Facility: CLINIC | Age: 67
End: 2022-11-07

## 2022-11-07 VITALS
OXYGEN SATURATION: 99 % | HEART RATE: 76 BPM | TEMPERATURE: 97.3 F | WEIGHT: 132 LBS | HEIGHT: 66 IN | DIASTOLIC BLOOD PRESSURE: 70 MMHG | BODY MASS INDEX: 21.21 KG/M2 | SYSTOLIC BLOOD PRESSURE: 126 MMHG

## 2022-11-07 LAB
GLUCOSE BLDC GLUCOMTR-MCNC: 229
HBA1C MFR BLD HPLC: 6.2

## 2022-11-07 PROCEDURE — 82962 GLUCOSE BLOOD TEST: CPT

## 2022-11-07 PROCEDURE — 83036 HEMOGLOBIN GLYCOSYLATED A1C: CPT | Mod: QW

## 2022-11-07 PROCEDURE — 99214 OFFICE O/P EST MOD 30 MIN: CPT | Mod: 25

## 2022-11-07 NOTE — HISTORY OF PRESENT ILLNESS
[FreeTextEntry1] : 66 year old M with history of HTN and DMT2 who presents from hospital with complaints of ~30 pounds weight loss over the past month. Said that he has noted a significant decrease in his appetite (currently eats minimal meals twice daily) and as a result has lost ~30 pounds over the past month. Has felt weak as a result but otherwise denied any other significant complaints. \par \par Endocrine consulted for hyperthyroidism with TSH <0.1 TT3 266/275 FT4 5.73. Patient denies palpitations, diarrhea or heat\par intolerance but has been experiencing tremors. No FH of thyroid disease. No Hx of thyroid nodules. No neck pain or flu like symptoms. No cardiac history. Has never been on lithium or amiodarone. Received IV contrast for CT scan done for\par malignancy workup and was found to have a 1.4 cm indeterminate L adrenal nodule. No diaphoresis, headaches or flushing No proximal muscle weakness or easy bruising or bleeding. No uncontrolled hypertension, hyponatremia or hyperkalemia.\par \par \par TSI negative \par Currently off of methimazole therapy. \par Previous TFTs were within normal limits \par Denied any overt hyperthyroid symptoms today \par \par For DM2, no known complications. Takes Janumet 50-1000mg BID. Repaglinide 1 mg TID  \par \par No hypoglycemia \par at home. He has been watching his carbs \par His physical activity is walking \par Optho: 4 months, no retinopathy \par Neuropathy: None \par \par After hospital stay he is currently on : \par metoprolol 25 mg bid \par janumet  mg bid \par repaglinide 1 mg TID\par losartan 50 mg daily \par \par \par \par EXAM: US THYROID \par \par PROCEDURE DATE: Oct 7 2020 \par INTERPRETATION: CLINICAL INFORMATION: Hyperthyroidism. Evaluate for thyroid nodules. \par COMPARISON: None available. \par TECHNIQUE: Sonography of the thyroid. \par FINDINGS: \par Right Lobe: 3.1 x 2.0 x 2.1 cm. Diffusely heterogeneous without discrete nodules. Normal vascularity. \par Left Lobe: 3.6 x 1.7 x 2.3 cm. Diffusely heterogeneous without discrete nodules. Normal vascularity. \par Isthmus: 2 mm. \par Cervical Lymph Nodes: No enlarged or abnormal morphology cervical nodes. \par IMPRESSION: \par Diffusely heterogeneous thyroid gland without nodules. \par \par \par MRI ABDOMEN \par ------------------\par ADRENALS: 1.2 x 1.2 cm left adrenal nodule demonstrates signal dropout between in and out of phase images, typical for adrenal adenoma. A smaller adrenal adenoma in the lateral limb of the left adrenal gland measures 1.0 x 0.4 cm.\par \par In the interim, \par -Reported that he had surgery on 04/06 \par -Patient has remained off of methimazole\par -No HP or tremors \par -Weight has been stable\par -No hair loss \par -No abnormal bowel movements \par \par

## 2022-11-07 NOTE — ASSESSMENT
[FreeTextEntry1] : 67 yr old M with Hx of DM2, A1C 7.7%, no previous history of thyroid disorder here with weight loss found to have hyperthyroidism (not in thyroid storm) after receiving IV contrast and found to have indeterminate L adrenal nodule.\par \par \par Hyperthyroidism\par -TFTs have normalized \par -Clinically euthyroid today \par -Now off of methimazole, will assess further need from here \par -Thyroid ultrasound not showing any discrete nodules, not a hypervascular gland\par -Continue metoprolol 25 mg BID\par \par Adrenal nodule\par -1.2 x 1.2 cm left adrenal nodule demonstrates signal dropout between in and\par out of phase images, typical for adrenal adenoma. A smaller adrenal adenoma in\par the lateral limb of the left adrenal gland measures 1.0 x 0.4 cm.\par -Adrenal testing was within normal limits \par -Last CT in Nov 2021, displaying interval stability \par \par Type 2 diabetes mellitus with hyperglycemia, without long-term current use of\par insulin\par -Continue Janumet  mg BID \par -Continue Repaglinide 1 mg TID with meals \par -Last HgA1C of 6.2% \par \par -Follow up in 3-4 months. \par

## 2022-11-11 NOTE — REASON FOR VISIT
no lesions,  no deformities,  no traumatic injuries, chest wall non-tender,  no masses present, breathing is unlabored without accessory muscle use, normal breath sounds [Follow - Up] : a follow-up visit

## 2022-11-14 LAB
ALBUMIN SERPL ELPH-MCNC: 4.4 G/DL
ALP BLD-CCNC: 47 U/L
ALT SERPL-CCNC: 9 U/L
ANION GAP SERPL CALC-SCNC: 11 MMOL/L
AST SERPL-CCNC: 14 U/L
BILIRUB SERPL-MCNC: 0.5 MG/DL
BUN SERPL-MCNC: 20 MG/DL
CALCIUM SERPL-MCNC: 9.7 MG/DL
CHLORIDE SERPL-SCNC: 102 MMOL/L
CHOLEST SERPL-MCNC: 98 MG/DL
CO2 SERPL-SCNC: 25 MMOL/L
CREAT SERPL-MCNC: 0.82 MG/DL
CREAT SPEC-SCNC: 90 MG/DL
EGFR: 96 ML/MIN/1.73M2
GLUCOSE SERPL-MCNC: 193 MG/DL
HDLC SERPL-MCNC: 37 MG/DL
LDLC SERPL CALC-MCNC: 49 MG/DL
MICROALBUMIN 24H UR DL<=1MG/L-MCNC: <1.2 MG/DL
MICROALBUMIN/CREAT 24H UR-RTO: NORMAL MG/G
NONHDLC SERPL-MCNC: 61 MG/DL
POTASSIUM SERPL-SCNC: 4.5 MMOL/L
PROT SERPL-MCNC: 7.2 G/DL
SODIUM SERPL-SCNC: 138 MMOL/L
T3 SERPL-MCNC: 91 NG/DL
T4 FREE SERPL-MCNC: 1.1 NG/DL
TRIGL SERPL-MCNC: 63 MG/DL
TSH SERPL-ACNC: 2.17 UIU/ML

## 2022-12-02 ENCOUNTER — RX RENEWAL (OUTPATIENT)
Age: 67
End: 2022-12-02

## 2023-05-08 ENCOUNTER — APPOINTMENT (OUTPATIENT)
Dept: ENDOCRINOLOGY | Facility: CLINIC | Age: 68
End: 2023-05-08
Payer: COMMERCIAL

## 2023-05-08 VITALS
HEIGHT: 66 IN | HEART RATE: 70 BPM | BODY MASS INDEX: 21.86 KG/M2 | OXYGEN SATURATION: 96 % | DIASTOLIC BLOOD PRESSURE: 80 MMHG | SYSTOLIC BLOOD PRESSURE: 140 MMHG | WEIGHT: 136 LBS

## 2023-05-08 PROCEDURE — 99214 OFFICE O/P EST MOD 30 MIN: CPT

## 2023-05-08 PROCEDURE — 83036 HEMOGLOBIN GLYCOSYLATED A1C: CPT | Mod: QW

## 2023-05-08 RX ORDER — CHOLECALCIFEROL (VITAMIN D3) 10(400)/ML
DROPS ORAL
Qty: 2 | Refills: 5 | Status: ACTIVE | COMMUNITY
Start: 2023-05-08 | End: 1900-01-01

## 2023-05-08 RX ORDER — LANCING DEVICE
EACH MISCELLANEOUS
Qty: 4 | Refills: 2 | Status: ACTIVE | COMMUNITY
Start: 2023-05-08 | End: 1900-01-01

## 2023-05-08 RX ORDER — LANCING DEVICE
W/DEVICE EACH MISCELLANEOUS
Qty: 1 | Refills: 0 | Status: ACTIVE | COMMUNITY
Start: 2023-05-08 | End: 1900-01-01

## 2023-05-08 RX ORDER — ALCOHOL ANTISEPTIC PADS
PADS, MEDICATED (EA) TOPICAL
Qty: 1 | Refills: 5 | Status: ACTIVE | COMMUNITY
Start: 2023-05-08 | End: 1900-01-01

## 2023-05-08 NOTE — HISTORY OF PRESENT ILLNESS
[FreeTextEntry1] : 66 year old M with history of HTN and DMT2 who presents from hospital with complaints of ~30 pounds weight loss over the past month. Said that he has noted a significant decrease in his appetite (currently eats minimal meals twice daily) and as a result has lost ~30 pounds over the past month. Has felt weak as a result but otherwise denied any other significant complaints. \par \par Endocrine consulted for hyperthyroidism with TSH <0.1 TT3 266/275 FT4 5.73. Patient denies palpitations, diarrhea or heat\par intolerance but has been experiencing tremors. No FH of thyroid disease. No Hx of thyroid nodules. No neck pain or flu like symptoms. No cardiac history. Has never been on lithium or amiodarone. Received IV contrast for CT scan done for\par malignancy workup and was found to have a 1.4 cm indeterminate L adrenal nodule. No diaphoresis, headaches or flushing No proximal muscle weakness or easy bruising or bleeding. No uncontrolled hypertension, hyponatremia or hyperkalemia.\par \par \par TSI negative \par Currently off of methimazole therapy. \par Previous TFTs were within normal limits \par Denied any overt hyperthyroid symptoms today \par \par For DM2, no known complications. Takes Janumet 50-1000mg BID. Repaglinide 1 mg TID  \par \par No hypoglycemia \par at home. He has been watching his carbs \par His physical activity is walking \par Optho: Retina specialist 2-3 weeks ago \par Neuropathy: None \par \par After hospital stay he is currently on : \par metoprolol 25 mg bid \par janumet  mg bid \par repaglinide 1 mg TID\par losartan 50 mg daily \par \par \par \par EXAM: US THYROID \par \par PROCEDURE DATE: Oct 7 2020 \par INTERPRETATION: CLINICAL INFORMATION: Hyperthyroidism. Evaluate for thyroid nodules. \par COMPARISON: None available. \par TECHNIQUE: Sonography of the thyroid. \par FINDINGS: \par Right Lobe: 3.1 x 2.0 x 2.1 cm. Diffusely heterogeneous without discrete nodules. Normal vascularity. \par Left Lobe: 3.6 x 1.7 x 2.3 cm. Diffusely heterogeneous without discrete nodules. Normal vascularity. \par Isthmus: 2 mm. \par Cervical Lymph Nodes: No enlarged or abnormal morphology cervical nodes. \par IMPRESSION: \par Diffusely heterogeneous thyroid gland without nodules. \par \par \par MRI ABDOMEN \par ------------------\par ADRENALS: 1.2 x 1.2 cm left adrenal nodule demonstrates signal dropout between in and out of phase images, typical for adrenal adenoma. A smaller adrenal adenoma in the lateral limb of the left adrenal gland measures 1.0 x 0.4 cm.\par \par In the interim, \par -Reported that he had surgery on 04/06 \par -Patient has remained off of methimazole\par -No HP or tremors \par -Weight has been stable\par -No hair loss \par -No abnormal bowel movements \par \par

## 2023-05-08 NOTE — REVIEW OF SYSTEMS
[Fatigue] : no fatigue [Decreased Appetite] : appetite not decreased [Visual Field Defect] : no visual field defect [Dysphagia] : no dysphagia [Dysphonia] : no dysphonia [Chest Pain] : no chest pain [Palpitations] : no palpitations [Shortness Of Breath] : no shortness of breath [Cough] : no cough [Nausea] : no nausea [Constipation] : no constipation [Vomiting] : no vomiting [Diarrhea] : no diarrhea [Polyuria] : no polyuria [Hesistancy] : no hesitancy [Joint Pain] : no joint pain [Muscle Weakness] : no muscle weakness [Acanthosis] : no acanthosis  [Acne] : no acne [Headaches] : no headaches [Dizziness] : no dizziness [Tremors] : no tremors [Pain/Numbness of Digits] : no pain/numbness of digits [Depression] : no depression [Polydipsia] : no polydipsia [Cold Intolerance] : no cold intolerance [Easy Bleeding] : no ~M tendency for easy bleeding [Easy Bruising] : no tendency for easy bruising

## 2023-05-08 NOTE — PHYSICAL EXAM
[Alert] : alert [Well Nourished] : well nourished [No Acute Distress] : no acute distress [Normal Sclera/Conjunctiva] : normal sclera/conjunctiva [PERRL] : pupils equal, round and reactive to light [Normal Outer Ear/Nose] : the ears and nose were normal in appearance [Normal TMs] : both tympanic membranes were normal [No Neck Mass] : no neck mass was observed [Thyroid Not Enlarged] : the thyroid was not enlarged [No Respiratory Distress] : no respiratory distress [Clear to Auscultation] : lungs were clear to auscultation bilaterally [Normal S1, S2] : normal S1 and S2 [Normal Rate] : heart rate was normal [Regular Rhythm] : with a regular rhythm [Normal Bowel Sounds] : normal bowel sounds [Not Tender] : non-tender [Soft] : abdomen soft [Normal Gait] : normal gait [No Joint Swelling] : no joint swelling seen [No Rash] : no rash [No Skin Lesions] : no skin lesions [Normal Reflexes] : deep tendon reflexes were 2+ and symmetric [Oriented x3] : oriented to person, place, and time [Normal Insight/Judgement] : insight and judgment were intact

## 2023-05-08 NOTE — ASSESSMENT
[FreeTextEntry1] : 67 yr old M with Hx of DM2, A1C 7.7%, no previous history of thyroid disorder here with weight loss found to have hyperthyroidism (not in thyroid storm) after receiving IV contrast and found to have indeterminate L adrenal nodule.\par \par Type 2 diabetes mellitus with hyperglycemia, without long-term current use of insulin\par -Continue Janumet  mg BID \par -Continue Repaglinide 1 mg TID with meals \par -Last HgA1C of 6.2% \par \par Hyperthyroidism\par -TFTs have normalized \par -Clinically euthyroid today \par -Now off of methimazole\par -Thyroid ultrasound not showing any discrete nodules, not a hypervascular gland\par -Continue metoprolol 25 mg daily\par \par \par Adrenal nodule\par -1.2 x 1.2 cm left adrenal nodule demonstrates signal dropout between in and\par out of phase images, typical for adrenal adenoma. A smaller adrenal adenoma in\par the lateral limb of the left adrenal gland measures 1.0 x 0.4 cm.\par -Adrenal testing was within normal limits \par -Last CT in Nov 2021, displaying interval stability \par (Dr. Palacios Ashley )\par \par \par -Follow up in 3-4 months. \par

## 2023-05-11 LAB
ANION GAP SERPL CALC-SCNC: 13 MMOL/L
BUN SERPL-MCNC: 16 MG/DL
CALCIUM SERPL-MCNC: 9.9 MG/DL
CHLORIDE SERPL-SCNC: 102 MMOL/L
CHOLEST SERPL-MCNC: 76 MG/DL
CO2 SERPL-SCNC: 24 MMOL/L
CREAT SERPL-MCNC: 1.06 MG/DL
CREAT SPEC-SCNC: 144 MG/DL
EGFR: 77 ML/MIN/1.73M2
GLUCOSE SERPL-MCNC: 163 MG/DL
HBA1C MFR BLD HPLC: 6
HDLC SERPL-MCNC: 37 MG/DL
LDLC SERPL CALC-MCNC: 30 MG/DL
MICROALBUMIN 24H UR DL<=1MG/L-MCNC: <1.2 MG/DL
MICROALBUMIN/CREAT 24H UR-RTO: NORMAL MG/G
NONHDLC SERPL-MCNC: 39 MG/DL
POTASSIUM SERPL-SCNC: 4.7 MMOL/L
SODIUM SERPL-SCNC: 139 MMOL/L
T4 FREE SERPL-MCNC: 1.2 NG/DL
TRIGL SERPL-MCNC: 45 MG/DL
TSH SERPL-ACNC: 2.1 UIU/ML

## 2023-11-08 ENCOUNTER — APPOINTMENT (OUTPATIENT)
Dept: ENDOCRINOLOGY | Facility: CLINIC | Age: 68
End: 2023-11-08
Payer: MEDICARE

## 2023-11-08 VITALS
HEIGHT: 66 IN | BODY MASS INDEX: 22.82 KG/M2 | DIASTOLIC BLOOD PRESSURE: 80 MMHG | WEIGHT: 142 LBS | SYSTOLIC BLOOD PRESSURE: 120 MMHG | HEART RATE: 67 BPM | OXYGEN SATURATION: 97 %

## 2023-11-08 DIAGNOSIS — E27.8 OTHER SPECIFIED DISORDERS OF ADRENAL GLAND: ICD-10-CM

## 2023-11-08 DIAGNOSIS — E11.9 TYPE 2 DIABETES MELLITUS W/OUT COMPLICATIONS: ICD-10-CM

## 2023-11-08 DIAGNOSIS — E05.90 THYROTOXICOSIS, UNSPECIFIED W/OUT THYROTOXIC CRISIS OR STORM: ICD-10-CM

## 2023-11-08 PROCEDURE — 83036 HEMOGLOBIN GLYCOSYLATED A1C: CPT | Mod: QW

## 2023-11-08 PROCEDURE — 99214 OFFICE O/P EST MOD 30 MIN: CPT

## 2023-11-10 LAB
ALBUMIN SERPL ELPH-MCNC: 5 G/DL
ALP BLD-CCNC: 54 U/L
ALT SERPL-CCNC: 11 U/L
ANION GAP SERPL CALC-SCNC: 12 MMOL/L
AST SERPL-CCNC: 12 U/L
BILIRUB SERPL-MCNC: 0.6 MG/DL
BUN SERPL-MCNC: 15 MG/DL
CALCIUM SERPL-MCNC: 9.8 MG/DL
CHLORIDE SERPL-SCNC: 104 MMOL/L
CHOLEST SERPL-MCNC: 106 MG/DL
CO2 SERPL-SCNC: 27 MMOL/L
CREAT SERPL-MCNC: 0.91 MG/DL
CREAT SPEC-SCNC: 119 MG/DL
EGFR: 92 ML/MIN/1.73M2
GLUCOSE SERPL-MCNC: 144 MG/DL
HBA1C MFR BLD HPLC: 6.6
HDLC SERPL-MCNC: 40 MG/DL
LDLC SERPL CALC-MCNC: 46 MG/DL
MICROALBUMIN 24H UR DL<=1MG/L-MCNC: <1.2 MG/DL
MICROALBUMIN/CREAT 24H UR-RTO: NORMAL MG/G
NONHDLC SERPL-MCNC: 66 MG/DL
POTASSIUM SERPL-SCNC: 4.8 MMOL/L
PROT SERPL-MCNC: 7.4 G/DL
SODIUM SERPL-SCNC: 143 MMOL/L
T3 SERPL-MCNC: 93 NG/DL
T4 FREE SERPL-MCNC: 1.2 NG/DL
TRIGL SERPL-MCNC: 110 MG/DL
TSH SERPL-ACNC: 2.93 UIU/ML

## 2023-11-27 ENCOUNTER — RX RENEWAL (OUTPATIENT)
Age: 68
End: 2023-11-27

## 2023-11-27 RX ORDER — REPAGLINIDE 1 MG/1
1 TABLET ORAL
Qty: 270 | Refills: 3 | Status: ACTIVE | COMMUNITY
Start: 2020-10-13 | End: 1900-01-01

## 2024-10-10 NOTE — PATIENT PROFILE ADULT - BRADEN FRICTION AND SHEAR
Lab Results   Component Value Date    HGBA1C 5.2 07/26/2024     Well controlled with metformin and Mounjaro.   (3) no apparent problem

## 2024-11-22 ENCOUNTER — RX RENEWAL (OUTPATIENT)
Age: 69
End: 2024-11-22